# Patient Record
Sex: MALE | Race: WHITE | Employment: UNEMPLOYED | URBAN - METROPOLITAN AREA
[De-identification: names, ages, dates, MRNs, and addresses within clinical notes are randomized per-mention and may not be internally consistent; named-entity substitution may affect disease eponyms.]

---

## 2019-08-28 ENCOUNTER — APPOINTMENT (OUTPATIENT)
Dept: GENERAL RADIOLOGY | Age: 74
DRG: 041 | End: 2019-08-28
Payer: MEDICARE

## 2019-08-28 ENCOUNTER — APPOINTMENT (OUTPATIENT)
Dept: CT IMAGING | Age: 74
DRG: 041 | End: 2019-08-28
Payer: MEDICARE

## 2019-08-28 ENCOUNTER — HOSPITAL ENCOUNTER (INPATIENT)
Age: 74
LOS: 2 days | Discharge: NURSING, REHAB OR LONG-TERM CARE | DRG: 041 | End: 2019-08-30
Attending: EMERGENCY MEDICINE | Admitting: INTERNAL MEDICINE
Payer: MEDICARE

## 2019-08-28 DIAGNOSIS — R47.1 DYSARTHRIA: ICD-10-CM

## 2019-08-28 DIAGNOSIS — R29.810 FACIAL DROOP DUE TO ACUTE STROKE (HCC): Primary | ICD-10-CM

## 2019-08-28 DIAGNOSIS — I63.9 FACIAL DROOP DUE TO ACUTE STROKE (HCC): Primary | ICD-10-CM

## 2019-08-28 DIAGNOSIS — R47.01 APHASIA: ICD-10-CM

## 2019-08-28 LAB
A/G RATIO: 1.4 (ref 1.1–2.2)
ABO/RH: NORMAL
ALBUMIN SERPL-MCNC: 4.3 G/DL (ref 3.4–5)
ALP BLD-CCNC: 61 U/L (ref 40–129)
ALT SERPL-CCNC: 22 U/L (ref 10–40)
ANION GAP SERPL CALCULATED.3IONS-SCNC: 11 MMOL/L (ref 3–16)
ANTIBODY SCREEN: NORMAL
AST SERPL-CCNC: 24 U/L (ref 15–37)
BASOPHILS ABSOLUTE: 0.1 K/UL (ref 0–0.2)
BASOPHILS RELATIVE PERCENT: 1.1 %
BILIRUB SERPL-MCNC: 0.5 MG/DL (ref 0–1)
BUN BLDV-MCNC: 11 MG/DL (ref 7–20)
CALCIUM SERPL-MCNC: 9 MG/DL (ref 8.3–10.6)
CHLORIDE BLD-SCNC: 95 MMOL/L (ref 99–110)
CO2: 24 MMOL/L (ref 21–32)
CREAT SERPL-MCNC: 0.7 MG/DL (ref 0.8–1.3)
EOSINOPHILS ABSOLUTE: 0 K/UL (ref 0–0.6)
EOSINOPHILS RELATIVE PERCENT: 0.4 %
ETHANOL: NORMAL MG/DL (ref 0–0.08)
GFR AFRICAN AMERICAN: >60
GFR NON-AFRICAN AMERICAN: >60
GLOBULIN: 3 G/DL
GLUCOSE BLD-MCNC: 101 MG/DL
GLUCOSE BLD-MCNC: 110 MG/DL (ref 70–99)
HCT VFR BLD CALC: 40.3 % (ref 40.5–52.5)
HEMOGLOBIN: 14.2 G/DL (ref 13.5–17.5)
INR BLD: 1.04 (ref 0.86–1.14)
LYMPHOCYTES ABSOLUTE: 0.9 K/UL (ref 1–5.1)
LYMPHOCYTES RELATIVE PERCENT: 14.3 %
MCH RBC QN AUTO: 33 PG (ref 26–34)
MCHC RBC AUTO-ENTMCNC: 35.4 G/DL (ref 31–36)
MCV RBC AUTO: 93.2 FL (ref 80–100)
MONOCYTES ABSOLUTE: 0.5 K/UL (ref 0–1.3)
MONOCYTES RELATIVE PERCENT: 9 %
NEUTROPHILS ABSOLUTE: 4.5 K/UL (ref 1.7–7.7)
NEUTROPHILS RELATIVE PERCENT: 75.2 %
PDW BLD-RTO: 14.3 % (ref 12.4–15.4)
PLATELET # BLD: 154 K/UL (ref 135–450)
PMV BLD AUTO: 6.9 FL (ref 5–10.5)
POTASSIUM REFLEX MAGNESIUM: 4.1 MMOL/L (ref 3.5–5.1)
PROTHROMBIN TIME: 11.8 SEC (ref 9.8–13)
RBC # BLD: 4.32 M/UL (ref 4.2–5.9)
SODIUM BLD-SCNC: 130 MMOL/L (ref 136–145)
TOTAL PROTEIN: 7.3 G/DL (ref 6.4–8.2)
TROPONIN: <0.01 NG/ML
WBC # BLD: 6 K/UL (ref 4–11)

## 2019-08-28 PROCEDURE — 86901 BLOOD TYPING SEROLOGIC RH(D): CPT

## 2019-08-28 PROCEDURE — 6370000000 HC RX 637 (ALT 250 FOR IP): Performed by: INTERNAL MEDICINE

## 2019-08-28 PROCEDURE — 84484 ASSAY OF TROPONIN QUANT: CPT

## 2019-08-28 PROCEDURE — 70496 CT ANGIOGRAPHY HEAD: CPT

## 2019-08-28 PROCEDURE — 93005 ELECTROCARDIOGRAM TRACING: CPT | Performed by: EMERGENCY MEDICINE

## 2019-08-28 PROCEDURE — 70450 CT HEAD/BRAIN W/O DYE: CPT

## 2019-08-28 PROCEDURE — G0480 DRUG TEST DEF 1-7 CLASSES: HCPCS

## 2019-08-28 PROCEDURE — 80053 COMPREHEN METABOLIC PANEL: CPT

## 2019-08-28 PROCEDURE — 71045 X-RAY EXAM CHEST 1 VIEW: CPT

## 2019-08-28 PROCEDURE — 2060000000 HC ICU INTERMEDIATE R&B

## 2019-08-28 PROCEDURE — 99285 EMERGENCY DEPT VISIT HI MDM: CPT

## 2019-08-28 PROCEDURE — 85610 PROTHROMBIN TIME: CPT

## 2019-08-28 PROCEDURE — 6360000004 HC RX CONTRAST MEDICATION: Performed by: EMERGENCY MEDICINE

## 2019-08-28 PROCEDURE — 6370000000 HC RX 637 (ALT 250 FOR IP): Performed by: EMERGENCY MEDICINE

## 2019-08-28 PROCEDURE — 86850 RBC ANTIBODY SCREEN: CPT

## 2019-08-28 PROCEDURE — 86900 BLOOD TYPING SEROLOGIC ABO: CPT

## 2019-08-28 PROCEDURE — 85025 COMPLETE CBC W/AUTO DIFF WBC: CPT

## 2019-08-28 PROCEDURE — 2580000003 HC RX 258: Performed by: INTERNAL MEDICINE

## 2019-08-28 RX ORDER — ASPIRIN 300 MG/1
300 SUPPOSITORY RECTAL DAILY
Status: DISCONTINUED | OUTPATIENT
Start: 2019-08-29 | End: 2019-08-30 | Stop reason: HOSPADM

## 2019-08-28 RX ORDER — ONDANSETRON 2 MG/ML
4 INJECTION INTRAMUSCULAR; INTRAVENOUS EVERY 6 HOURS PRN
Status: DISCONTINUED | OUTPATIENT
Start: 2019-08-28 | End: 2019-08-30 | Stop reason: HOSPADM

## 2019-08-28 RX ORDER — METOPROLOL SUCCINATE 50 MG/1
50 TABLET, EXTENDED RELEASE ORAL EVERY EVENING
Status: DISCONTINUED | OUTPATIENT
Start: 2019-08-28 | End: 2019-08-30 | Stop reason: HOSPADM

## 2019-08-28 RX ORDER — LABETALOL HYDROCHLORIDE 5 MG/ML
10 INJECTION, SOLUTION INTRAVENOUS EVERY 10 MIN PRN
Status: DISCONTINUED | OUTPATIENT
Start: 2019-08-28 | End: 2019-08-30 | Stop reason: HOSPADM

## 2019-08-28 RX ORDER — SODIUM CHLORIDE 0.9 % (FLUSH) 0.9 %
10 SYRINGE (ML) INJECTION PRN
Status: DISCONTINUED | OUTPATIENT
Start: 2019-08-28 | End: 2019-08-30 | Stop reason: HOSPADM

## 2019-08-28 RX ORDER — SODIUM CHLORIDE 0.9 % (FLUSH) 0.9 %
10 SYRINGE (ML) INJECTION EVERY 12 HOURS SCHEDULED
Status: DISCONTINUED | OUTPATIENT
Start: 2019-08-28 | End: 2019-08-30 | Stop reason: HOSPADM

## 2019-08-28 RX ORDER — ASPIRIN 81 MG/1
81 TABLET ORAL DAILY
Status: DISCONTINUED | OUTPATIENT
Start: 2019-08-29 | End: 2019-08-30 | Stop reason: HOSPADM

## 2019-08-28 RX ORDER — ROSUVASTATIN CALCIUM 10 MG/1
40 TABLET, COATED ORAL NIGHTLY
Status: DISCONTINUED | OUTPATIENT
Start: 2019-08-28 | End: 2019-08-30 | Stop reason: HOSPADM

## 2019-08-28 RX ORDER — ASPIRIN 81 MG/1
324 TABLET, CHEWABLE ORAL ONCE
Status: COMPLETED | OUTPATIENT
Start: 2019-08-28 | End: 2019-08-28

## 2019-08-28 RX ORDER — METOPROLOL SUCCINATE 50 MG/1
50 TABLET, EXTENDED RELEASE ORAL EVERY EVENING
Status: ON HOLD | COMMUNITY
Start: 2018-10-02 | End: 2019-09-09 | Stop reason: HOSPADM

## 2019-08-28 RX ADMIN — ASPIRIN 81 MG 324 MG: 81 TABLET ORAL at 16:41

## 2019-08-28 RX ADMIN — IOPAMIDOL 75 ML: 755 INJECTION, SOLUTION INTRAVENOUS at 14:14

## 2019-08-28 RX ADMIN — SODIUM CHLORIDE, PRESERVATIVE FREE 10 ML: 5 INJECTION INTRAVENOUS at 21:02

## 2019-08-28 RX ADMIN — METOPROLOL SUCCINATE 50 MG: 50 TABLET, EXTENDED RELEASE ORAL at 21:02

## 2019-08-28 RX ADMIN — ROSUVASTATIN CALCIUM 40 MG: 10 TABLET, FILM COATED ORAL at 21:02

## 2019-08-28 ASSESSMENT — PAIN SCALES - GENERAL: PAINLEVEL_OUTOF10: 0

## 2019-08-28 NOTE — ED NOTES
Bed: B-10  Expected date:   Expected time:   Means of arrival:   Comments:  LILO Driver RN  08/28/19 8854

## 2019-08-28 NOTE — ED NOTES
ED SBAR report provider to Maribeth Chamorro RN. Patient to be transported to Room 3110 via stretcher by transport tech. Patient transported with bedside cardiac monitor and with IV medications infusing. IV site clean, dry, and intact. MEWS score and pain assessed and documented. Updated patient and family on plan of care.      Rea Frye RN  08/28/19 0383

## 2019-08-28 NOTE — ED PROVIDER NOTES
loss  Facial Palsy:  2 - partial paralysis (total or near total paralysis of the lower face)  Motor-Arm-Left:  0 - no drift, limb holds 90 (or 45) degrees for full 10 seconds  Motor-Leg-Left:  0 - no drift; leg holds 30 degree position for full 5 seconds  Motor-Arm-Right:  1 - drift, limb holds 90 (or 45) degrees but drifts down before full 10 seconds: does not hit bed  Motor-Leg-Right:  0 - no drift; leg holds 30 degree position for full 5 seconds  Limb Ataxia:  0 - absent  Sensory:  0 - normal; no sensory loss  Best Language:  1 - mild to moderate aphasia; some obvious loss of fluency or facility of comprehension without significant limitation on ideas expressed or form of expression. Reduction of speech and/or comprehension, however, makes conversation about provided materials difficult or impossible. For example, in conversation about provided materials, examiner can identify picture or naming card content from patient's response. Dysarthria:  1 - mild to moderate, patient slurs at least some words and at worst, can be understood with some difficulty  Extinction and Inattention:  0 - no abnormality  Total: 6        LABS and DIAGNOSTIC RESULTS  EKG  The Ekg interpreted by me shows  normal sinus rhythm with a rate of 64  Axis is   Normal  QTc is  normal  First-degree AV block  ST Segments: normal  No prior EKG available for comparison. RADIOLOGY  X-RAYS:  I have reviewed radiologic plain film image(s). ALL OTHER NON-PLAIN FILM IMAGES SUCH AS CT, ULTRASOUND AND MRI HAVE BEEN READ BY THE RADIOLOGIST. XR CHEST PORTABLE   Final Result   Cardiomegaly with bibasilar hypoaeration. Otherwise, no acute abnormalities   seen in the chest         CTA HEAD NECK W CONTRAST   Preliminary Result   1. Approximately 50% left ICA stenosis by NASCET criteria due to mixed   atherosclerotic plaque. 2. Approximately 20% right ICA stenosis by NASCET criteria due to eccentric   calcified atherosclerotic plaque.    3. No generated using the [address] [place] [address] dictation system. I created this record but it may contain dictation errors.           Malachi Solis MD  08/28/19 1845

## 2019-08-28 NOTE — H&P
10\" (1.778 m)   Wt 191 lb 2.2 oz (86.7 kg)   SpO2 98%   BMI 27.43 kg/m²     General appearance:  No apparent distress, appears stated age and cooperative. HEENT:  Normal cephalic, atraumatic without obvious deformity. Pupils equal, round, and reactive to light. Extra ocular muscles intact. Conjunctivae/corneas clear. Neck: Supple, with full range of motion. No jugular venous distention. Trachea midline. Respiratory:  Normal respiratory effort. Clear to auscultation, bilaterally without Rales/Wheezes/Rhonchi. Cardiovascular:  Regular rate and rhythm with normal S1/S2 without murmurs, rubs or gallops. Abdomen: Soft, non-tender, non-distended with normal bowel sounds. Musculoskeletal:  No clubbing, cyanosis or edema bilaterally. Full range of motion without deformity. Skin: Skin color, texture, turgor normal.  No rashes or lesions. Neurologic: Right arm weakness and discoordination. Right facial droop deep tendon  reflexes brisk on the right side  Psychiatric:  Alert and oriented, thought content appropriate, normal insight  Capillary Refill: Brisk,< 3 seconds   Peripheral Pulses: +2 palpable, equal bilaterally       Labs:     Recent Labs     08/28/19  1434   WBC 6.0   HGB 14.2   HCT 40.3*        Recent Labs     08/28/19  1434   *   K 4.1   CL 95*   CO2 24   BUN 11   CREATININE 0.7*   CALCIUM 9.0     Recent Labs     08/28/19  1434   AST 24   ALT 22   BILITOT 0.5   ALKPHOS 61     Recent Labs     08/28/19  1434   INR 1.04     Recent Labs     08/28/19  1434   TROPONINI <0.01       Urinalysis:    No results found for: Inell Broom, BACTERIA, RBCUA, BLOODU, [address], [address]    Radiology:      reviewed by me    XR CHEST PORTABLE   Final Result   Cardiomegaly with bibasilar hypoaeration. Otherwise, no acute abnormalities   seen in the chest         CTA HEAD NECK W CONTRAST   Final Result   1. Approximately 50% left ICA stenosis by NASCET criteria due to mixed   atherosclerotic plaque.    2.

## 2019-08-28 NOTE — ED NOTES
Pharmacy Medication Reconciliation Note     List of medications patient is currently taking is complete. Allergies:  Patient has no known allergies. Source of information:   1. Disp. Record  2. Patient/family    Notes regarding home medications:   1.  Reports he did not take his medication yet today    Denies taking any other OTC or herbal medications    Emily Daigle PharmD, BCPS  8/28/2019  5:35 PM

## 2019-08-29 ENCOUNTER — APPOINTMENT (OUTPATIENT)
Dept: MRI IMAGING | Age: 74
DRG: 041 | End: 2019-08-29
Payer: MEDICARE

## 2019-08-29 LAB
ANION GAP SERPL CALCULATED.3IONS-SCNC: 11 MMOL/L (ref 3–16)
BUN BLDV-MCNC: 9 MG/DL (ref 7–20)
CALCIUM SERPL-MCNC: 9.5 MG/DL (ref 8.3–10.6)
CHLORIDE BLD-SCNC: 97 MMOL/L (ref 99–110)
CHOLESTEROL, TOTAL: 186 MG/DL (ref 0–199)
CO2: 23 MMOL/L (ref 21–32)
CREAT SERPL-MCNC: 0.7 MG/DL (ref 0.8–1.3)
EKG ATRIAL RATE: 64 BPM
EKG DIAGNOSIS: NORMAL
EKG P AXIS: 83 DEGREES
EKG P-R INTERVAL: 214 MS
EKG Q-T INTERVAL: 398 MS
EKG QRS DURATION: 72 MS
EKG QTC CALCULATION (BAZETT): 410 MS
EKG R AXIS: -9 DEGREES
EKG T AXIS: 23 DEGREES
EKG VENTRICULAR RATE: 64 BPM
ESTIMATED AVERAGE GLUCOSE: 88.2 MG/DL
GFR AFRICAN AMERICAN: >60
GFR NON-AFRICAN AMERICAN: >60
GLUCOSE BLD-MCNC: 105 MG/DL (ref 70–99)
HBA1C MFR BLD: 4.7 %
HCT VFR BLD CALC: 40.7 % (ref 40.5–52.5)
HDLC SERPL-MCNC: 64 MG/DL (ref 40–60)
HEMOGLOBIN: 14.4 G/DL (ref 13.5–17.5)
LDL CHOLESTEROL CALCULATED: 100 MG/DL
LV EF: 55 %
LVEF MODALITY: NORMAL
MCH RBC QN AUTO: 32.6 PG (ref 26–34)
MCHC RBC AUTO-ENTMCNC: 35.3 G/DL (ref 31–36)
MCV RBC AUTO: 92.4 FL (ref 80–100)
PDW BLD-RTO: 14.3 % (ref 12.4–15.4)
PLATELET # BLD: 153 K/UL (ref 135–450)
PMV BLD AUTO: 7.2 FL (ref 5–10.5)
POTASSIUM SERPL-SCNC: 4.7 MMOL/L (ref 3.5–5.1)
RBC # BLD: 4.41 M/UL (ref 4.2–5.9)
SODIUM BLD-SCNC: 131 MMOL/L (ref 136–145)
TRIGL SERPL-MCNC: 110 MG/DL (ref 0–150)
VLDLC SERPL CALC-MCNC: 22 MG/DL
WBC # BLD: 4.9 K/UL (ref 4–11)

## 2019-08-29 PROCEDURE — 83036 HEMOGLOBIN GLYCOSYLATED A1C: CPT

## 2019-08-29 PROCEDURE — 70553 MRI BRAIN STEM W/O & W/DYE: CPT

## 2019-08-29 PROCEDURE — 93010 ELECTROCARDIOGRAM REPORT: CPT | Performed by: INTERNAL MEDICINE

## 2019-08-29 PROCEDURE — 2060000000 HC ICU INTERMEDIATE R&B

## 2019-08-29 PROCEDURE — 97127 HC SP THER IVNTJ W/FOCUS COG FUNCJ: CPT

## 2019-08-29 PROCEDURE — 97530 THERAPEUTIC ACTIVITIES: CPT

## 2019-08-29 PROCEDURE — 6360000004 HC RX CONTRAST MEDICATION: Performed by: INTERNAL MEDICINE

## 2019-08-29 PROCEDURE — A9577 INJ MULTIHANCE: HCPCS | Performed by: INTERNAL MEDICINE

## 2019-08-29 PROCEDURE — 80048 BASIC METABOLIC PNL TOTAL CA: CPT

## 2019-08-29 PROCEDURE — 6370000000 HC RX 637 (ALT 250 FOR IP): Performed by: INTERNAL MEDICINE

## 2019-08-29 PROCEDURE — 92610 EVALUATE SWALLOWING FUNCTION: CPT

## 2019-08-29 PROCEDURE — 94760 N-INVAS EAR/PLS OXIMETRY 1: CPT

## 2019-08-29 PROCEDURE — 2580000003 HC RX 258: Performed by: INTERNAL MEDICINE

## 2019-08-29 PROCEDURE — 80061 LIPID PANEL: CPT

## 2019-08-29 PROCEDURE — 85027 COMPLETE CBC AUTOMATED: CPT

## 2019-08-29 PROCEDURE — 97535 SELF CARE MNGMENT TRAINING: CPT

## 2019-08-29 PROCEDURE — 97166 OT EVAL MOD COMPLEX 45 MIN: CPT

## 2019-08-29 PROCEDURE — 93306 TTE W/DOPPLER COMPLETE: CPT

## 2019-08-29 PROCEDURE — 36415 COLL VENOUS BLD VENIPUNCTURE: CPT

## 2019-08-29 PROCEDURE — 97162 PT EVAL MOD COMPLEX 30 MIN: CPT

## 2019-08-29 PROCEDURE — 6360000002 HC RX W HCPCS: Performed by: INTERNAL MEDICINE

## 2019-08-29 PROCEDURE — 92523 SPEECH SOUND LANG COMPREHEN: CPT

## 2019-08-29 RX ADMIN — METOPROLOL SUCCINATE 50 MG: 50 TABLET, EXTENDED RELEASE ORAL at 20:25

## 2019-08-29 RX ADMIN — GADOBENATE DIMEGLUMINE 17 ML: 529 INJECTION, SOLUTION INTRAVENOUS at 11:12

## 2019-08-29 RX ADMIN — SODIUM CHLORIDE, PRESERVATIVE FREE 10 ML: 5 INJECTION INTRAVENOUS at 20:25

## 2019-08-29 RX ADMIN — ENOXAPARIN SODIUM 40 MG: 40 INJECTION SUBCUTANEOUS at 08:51

## 2019-08-29 RX ADMIN — SODIUM CHLORIDE, PRESERVATIVE FREE 10 ML: 5 INJECTION INTRAVENOUS at 08:57

## 2019-08-29 RX ADMIN — ROSUVASTATIN CALCIUM 40 MG: 10 TABLET, FILM COATED ORAL at 20:25

## 2019-08-29 RX ADMIN — ASPIRIN 81 MG: 81 TABLET ORAL at 08:51

## 2019-08-29 ASSESSMENT — PAIN SCALES - GENERAL
PAINLEVEL_OUTOF10: 0
PAINLEVEL_OUTOF10: 0

## 2019-08-29 NOTE — PROGRESS NOTES
Physical Therapy    Facility/Department: [address] PROGRESSIVE CARE  Initial Assessment/Treatment Session  This note serves as D/C summary if patient is discharged prior to next treatment session. NAME: Hector Salinas  : 1945  MRN: [id number]    Date of Service: 2019    Discharge Recommendations:  Patient would benefit from continued therapy after discharge, 5-7 sessions per week   PT Equipment Recommendations  Other: Will continue to assess    Assessment   Body structures, Functions, Activity limitations: Decreased functional mobility ; Decreased strength;Decreased safe awareness;Decreased balance  Assessment: Pt is a 69 y.o. M. admitted  for CVA, R-sided weakness. He presents with moderate dysarthria, and PT noted significant RLE weakness upon evaluation. Pt was very unsteady ambulating without walker support, requiring up to Mod A to maintain his balance. He would benefit from continued therapy to improve his safety awareness, balance, strength, and independence performing all mobility tasks. Recommend high-frequency therapy upon D/C to address these deficits. Hector Salinas scored a 16/24 on the AM-PAC short mobility form. Current research shows that an AM-PAC score of 17 or less is typically not associated with a discharge to the patient's home setting. Based on the patients AM-PAC score and their current functional mobility deficits, it is recommended that the patient have 5-7 sessions per week of Physical Therapy at d/c to increase the patients independence. Treatment Diagnosis: Decreased functional mobility; Difficulty walking; Decreased balance  Specific instructions for Next Treatment: Progress ambulation with LRAD; improve balance and RLE strength  Prognosis: Good  Decision Making: Medium Complexity  History: See below  Exam: Strength; ROM; Balance;  Ambulation  Clinical Presentation: Evolving  Barriers to Learning: Decreased safety awareness  REQUIRES PT FOLLOW UP: Electronically signed by Tonny Marrero, PT [id number] on 8/29/2019 at 1:26 PM

## 2019-08-29 NOTE — PROGRESS NOTES
assessment of auditory processing, reading and writing. Patient/Family Education:Education, results and recommendations given to the Pt and nurse, who verbalized understanding    Timed Code Treatment:  15 minutes    Total Treatment Time: 45 minutes    Please accept this document as a D/C summary if pt is discharged from the hospital prior to the next speech therapy session.        Nick Dunbar M.A./[facility]-SLP #[id number]

## 2019-08-29 NOTE — PROGRESS NOTES
Pt was transferred from ED to [facility] before this shift started. Pt has monitor on and MW is aware pt is here. Oriented pt to room, call light, hosp policy. Pt expressed understanding denied questions, denied pain.

## 2019-08-29 NOTE — PROGRESS NOTES
Hospital Medicine Progress Note      Admit Date: 8/28/2019         Overnight Events: No    CC: F/U for Acute stroke    HPI: The patient is a 69year old male, with a past medical history significant for HTN, who presented to [facility] [place] with 1 day history of right arm weakness, facial droop and dysarthria. Symptoms began the morning of admission. The patient was found at the bottom of the stairs and was unable to ambulate. The previous evening, the patient had gone to bed around 8 PM - this was his last known normal.   Imaging studies were confirmatory for an acute infarct. The patient was admitted for further workup and treatment. Neurology was consulted. Echo cardiogram was ordered. The patient was seen by PT/OT/SLP. Interval History/Subjective: No new complaints. The patient has been seen by neurology. He does have some trouble with word finding. Review of Systems:     Comprehensive ROS negative except as mentioned above. Past Medical History:        Diagnosis Date    Hypertension        Past Surgical History:        Procedure Laterality Date    PROSTATE SURGERY  2004       Allergies:  Patient has no known allergies. Past medical and surgical history reviewed. Any changes have been noted.      Scheduled and prn Medications:    Scheduled Meds:   metoprolol succinate  50 mg Oral QPM    sodium chloride flush  10 mL Intravenous 2 times per day    enoxaparin  40 mg Subcutaneous Daily    aspirin  81 mg Oral Daily    Or    aspirin  300 mg Rectal Daily    rosuvastatin  40 mg Oral Nightly     Continuous Infusions:  PRN Meds:.sodium chloride flush, magnesium hydroxide, ondansetron, labetalol, perflutren lipid microspheres    PHYSICAL EXAM:  BP (!) 174/89   Pulse 63   Temp 97.8 °F (36.6 °C) (Oral)   Resp 16   Ht 5' 10\" (1.778 m)   Wt 185 lb 13.6 oz (84.3 kg)   SpO2 97%   BMI 26.67 kg/m²       Intake/Output Summary (Last 24 hours) at 8/29/2019 8032  Last data filed at 8/28/2019

## 2019-08-29 NOTE — PROGRESS NOTES
Physical Therapy  Progress note  Pt requesting assist to restroom. Pt stood from chair with Min A, cues for hand placement, then ambulated 20' to commode with walker support, requiring Min A to correct for intermittent LOB to his R.  Pt sat at commode with Min A, then had a large BM. He required 50% assist for theodore-care. He stood to walker with CGA, then was able to maintain standing balance at sink with Min A while washing his hands. He required Min A to ambulate 20' back to chair with walker. He was positioned for comfort with lunch tray, call light in reach, alarm in place. Time In: 1348  Time Coded Tx: 12 min.    Electronically signed by Dario Hernandez, PT [id number] on 8/29/2019 at 2:56 PM

## 2019-08-29 NOTE — PROGRESS NOTES
ROM/MMT  Assistance / Modification: ANticipate min A for ADLs  OT Education: OT Role;Plan of Care;Transfer Training;Family Education  Barriers to Learning: cognition  REQUIRES OT FOLLOW UP: Yes  Activity Tolerance  Activity Tolerance: Patient limited by fatigue;Treatment limited secondary to decreased cognition  Safety Devices  Safety Devices in place: Yes  Type of devices: Left in chair;Call light within reach;Gait belt;Nurse notified; Chair alarm in place(RN Adela Land)           Patient Diagnosis(es): The primary encounter diagnosis was Facial droop due to acute stroke [facility]). Diagnoses of Aphasia and Dysarthria were also pertinent to this visit. has a past medical history of Hypertension. has a past surgical history that includes Prostate surgery (2004). Restrictions  Restrictions/Precautions  Restrictions/Precautions: Fall Risk  Position Activity Restriction  Other position/activity restrictions: NPO    Subjective   General  Chart Reviewed: Yes  Patient assessed for rehabilitation services?: Yes  Additional Pertinent Hx: Per H&P: \"73 y.o. male who presented to [facility] with 1 day history of right arm weakness and facial droop and dysarthria. Symptoms appear to have started this morning. Patient was found at the bottom of the stairs and unable to ambulate. Patient went to bed around 8 Pm yesterday evening this was his last known well. Patient had expressive aphasia and some dysarthria and was brought to the hospital past the window for TPA. \"  Family / Caregiver Present: No  Referring Practitioner: Uriel Pulliam MD  Diagnosis: Acute ischemic stroke likely left basal ganglia  Subjective  Subjective: Pt met bedside for OT eval/tx. Pt supine in bed with family present.  Pt agreeable to therapy, denies pain  Patient Currently in Pain: Denies  Pain Assessment  Pain Assessment: 0-10  Pain Level: 0  Vital Signs  Temp: 97.8 °F (36.6 °C)  Temp Source: Oral  Pulse: 63  Heart Rate Source: Monitor  Resp: 16  BP: (!) 174/89  BP Location: Left upper arm  MAP (mmHg): 114  Level of Consciousness: Alert  MEWS Score: 1  Patient Currently in Pain: Denies  Oxygen Therapy  SpO2: 97 %  Pulse Oximeter Device Mode: Intermittent  Pulse Oximeter Device Location: Finger  O2 Device: None (Room air)  Social/Functional History  Social/Functional History  Lives With: Spouse  Type of Home: (condo)  Home Layout: One level, Laundry in basement(pt's bedroom in basement)  Home Access: Stairs to enter with rails  Entrance Stairs - Number of Steps: 1 porch step  Bathroom Shower/Tub: Walk-in shower  Bathroom Toilet: Standard(has raised BSC to put over toilet but does not use)  Bathroom Equipment: Built-in shower seat, Grab bars in shower(sink near toilet)  Home Equipment: Cane, Rolling walker, Lift chair  ADL Assistance: Independent  Homemaking Assistance: Independent(shares with spouse)  Ambulation Assistance: Independent  Transfer Assistance: Independent  Active : Yes  Occupation: Retired  Type of occupation:  for [employer]  Additional Comments: Denies h/o falls. Plays golf 2x a week       Objective   Vision: Within Functional Limits  Hearing: Within functional limits    Orientation  Overall Orientation Status: Within Normal Limits     Balance  Sitting Balance: Stand by assistance(EOB)  Standing Balance: Minimal assistance(pt with posterior lean)  Functional Mobility  Functional - Mobility Device: No device  Activity: Other(bed > chair)  Assist Level: Minimal assistance  Functional Mobility Comments: Pt performed functional mobility short distance from bed > chair with min A.  Pt does demo unsteadiness, posterior lean  ADL  Feeding: NPO  Grooming: Setup(to wash face and brush teeth seated in recliner)  LE Dressing: Stand by assistance(pt adjusted socks while upright in bed)  Additional Comments: Anticipate min A for UB bathing, SBA for UB dressing, min A for LB bathing, min A for toileting based on ROM,

## 2019-08-30 ENCOUNTER — HOSPITAL ENCOUNTER (INPATIENT)
Age: 74
LOS: 11 days | Discharge: HOME HEALTH | DRG: 057 | End: 2019-09-10
Attending: PHYSICAL MEDICINE & REHABILITATION | Admitting: PHYSICAL MEDICINE & REHABILITATION
Payer: MEDICARE

## 2019-08-30 ENCOUNTER — HOSPITAL ENCOUNTER (INPATIENT)
Dept: CARDIAC CATH/INVASIVE PROCEDURES | Age: 74
Discharge: HOME | DRG: 041 | End: 2019-08-30
Payer: MEDICARE

## 2019-08-30 VITALS
SYSTOLIC BLOOD PRESSURE: 149 MMHG | HEIGHT: 70 IN | OXYGEN SATURATION: 99 % | WEIGHT: 180.12 LBS | RESPIRATION RATE: 17 BRPM | HEART RATE: 73 BPM | DIASTOLIC BLOOD PRESSURE: 54 MMHG | BODY MASS INDEX: 25.79 KG/M2 | TEMPERATURE: 98.2 F

## 2019-08-30 DIAGNOSIS — Z45.09 ENCOUNTER FOR ELECTRONIC ANALYSIS OF REVEAL EVENT RECORDER: Primary | ICD-10-CM

## 2019-08-30 DIAGNOSIS — I63.9 ISCHEMIC STROKE (HCC): ICD-10-CM

## 2019-08-30 LAB
ANION GAP SERPL CALCULATED.3IONS-SCNC: 11 MMOL/L (ref 3–16)
BASOPHILS ABSOLUTE: 0.1 K/UL (ref 0–0.2)
BASOPHILS RELATIVE PERCENT: 1.7 %
BUN BLDV-MCNC: 10 MG/DL (ref 7–20)
CALCIUM SERPL-MCNC: 9.2 MG/DL (ref 8.3–10.6)
CHLORIDE BLD-SCNC: 98 MMOL/L (ref 99–110)
CO2: 22 MMOL/L (ref 21–32)
CREAT SERPL-MCNC: 0.6 MG/DL (ref 0.8–1.3)
EOSINOPHILS ABSOLUTE: 0.1 K/UL (ref 0–0.6)
EOSINOPHILS RELATIVE PERCENT: 1.2 %
GFR AFRICAN AMERICAN: >60
GFR NON-AFRICAN AMERICAN: >60
GLUCOSE BLD-MCNC: 103 MG/DL (ref 70–99)
HCT VFR BLD CALC: 40.7 % (ref 40.5–52.5)
HEMOGLOBIN: 14.5 G/DL (ref 13.5–17.5)
LYMPHOCYTES ABSOLUTE: 1.2 K/UL (ref 1–5.1)
LYMPHOCYTES RELATIVE PERCENT: 23 %
MCH RBC QN AUTO: 33.1 PG (ref 26–34)
MCHC RBC AUTO-ENTMCNC: 35.6 G/DL (ref 31–36)
MCV RBC AUTO: 92.9 FL (ref 80–100)
MONOCYTES ABSOLUTE: 0.6 K/UL (ref 0–1.3)
MONOCYTES RELATIVE PERCENT: 10.6 %
NEUTROPHILS ABSOLUTE: 3.4 K/UL (ref 1.7–7.7)
NEUTROPHILS RELATIVE PERCENT: 63.5 %
PDW BLD-RTO: 14.5 % (ref 12.4–15.4)
PLATELET # BLD: 156 K/UL (ref 135–450)
PMV BLD AUTO: 7.4 FL (ref 5–10.5)
POTASSIUM REFLEX MAGNESIUM: 3.7 MMOL/L (ref 3.5–5.1)
RBC # BLD: 4.38 M/UL (ref 4.2–5.9)
SODIUM BLD-SCNC: 131 MMOL/L (ref 136–145)
WBC # BLD: 5.3 K/UL (ref 4–11)

## 2019-08-30 PROCEDURE — 6370000000 HC RX 637 (ALT 250 FOR IP): Performed by: PHYSICAL MEDICINE & REHABILITATION

## 2019-08-30 PROCEDURE — 94760 N-INVAS EAR/PLS OXIMETRY 1: CPT

## 2019-08-30 PROCEDURE — 0JH632Z INSERTION OF MONITORING DEVICE INTO CHEST SUBCUTANEOUS TISSUE AND FASCIA, PERCUTANEOUS APPROACH: ICD-10-PCS | Performed by: INTERNAL MEDICINE

## 2019-08-30 PROCEDURE — 93285 PRGRMG DEV EVAL SCRMS IP: CPT | Performed by: INTERNAL MEDICINE

## 2019-08-30 PROCEDURE — 97116 GAIT TRAINING THERAPY: CPT

## 2019-08-30 PROCEDURE — 6370000000 HC RX 637 (ALT 250 FOR IP): Performed by: NURSE PRACTITIONER

## 2019-08-30 PROCEDURE — 6370000000 HC RX 637 (ALT 250 FOR IP): Performed by: INTERNAL MEDICINE

## 2019-08-30 PROCEDURE — 85025 COMPLETE CBC W/AUTO DIFF WBC: CPT

## 2019-08-30 PROCEDURE — 33285 INSJ SUBQ CAR RHYTHM MNTR: CPT | Performed by: FAMILY MEDICINE

## 2019-08-30 PROCEDURE — 97113 AQUATIC THERAPY/EXERCISES: CPT

## 2019-08-30 PROCEDURE — 1280000000 HC REHAB R&B

## 2019-08-30 PROCEDURE — C1764 EVENT RECORDER, CARDIAC: HCPCS

## 2019-08-30 PROCEDURE — 97530 THERAPEUTIC ACTIVITIES: CPT

## 2019-08-30 PROCEDURE — 80048 BASIC METABOLIC PNL TOTAL CA: CPT

## 2019-08-30 PROCEDURE — 97112 NEUROMUSCULAR REEDUCATION: CPT

## 2019-08-30 PROCEDURE — 6360000002 HC RX W HCPCS: Performed by: INTERNAL MEDICINE

## 2019-08-30 PROCEDURE — 36415 COLL VENOUS BLD VENIPUNCTURE: CPT

## 2019-08-30 PROCEDURE — 2580000003 HC RX 258: Performed by: INTERNAL MEDICINE

## 2019-08-30 RX ORDER — METOPROLOL SUCCINATE 50 MG/1
50 TABLET, EXTENDED RELEASE ORAL EVERY EVENING
Status: DISCONTINUED | OUTPATIENT
Start: 2019-08-30 | End: 2019-09-10 | Stop reason: HOSPADM

## 2019-08-30 RX ORDER — SENNA AND DOCUSATE SODIUM 50; 8.6 MG/1; MG/1
2 TABLET, FILM COATED ORAL 2 TIMES DAILY
Status: DISCONTINUED | OUTPATIENT
Start: 2019-08-30 | End: 2019-09-06

## 2019-08-30 RX ORDER — ASPIRIN 300 MG/1
300 SUPPOSITORY RECTAL DAILY
Status: DISCONTINUED | OUTPATIENT
Start: 2019-08-31 | End: 2019-09-10 | Stop reason: HOSPADM

## 2019-08-30 RX ORDER — ONDANSETRON 4 MG/1
4 TABLET, FILM COATED ORAL EVERY 8 HOURS PRN
Status: CANCELLED | OUTPATIENT
Start: 2019-08-30

## 2019-08-30 RX ORDER — POLYETHYLENE GLYCOL 3350 17 G/17G
17 POWDER, FOR SOLUTION ORAL DAILY
Status: CANCELLED | OUTPATIENT
Start: 2019-08-30

## 2019-08-30 RX ORDER — ASPIRIN 81 MG/1
81 TABLET ORAL DAILY
Qty: 30 TABLET | Refills: 0 | Status: SHIPPED | OUTPATIENT
Start: 2019-08-31 | End: 2019-09-30

## 2019-08-30 RX ORDER — LISINOPRIL 5 MG/1
5 TABLET ORAL DAILY
Status: DISCONTINUED | OUTPATIENT
Start: 2019-08-30 | End: 2019-08-30 | Stop reason: HOSPADM

## 2019-08-30 RX ORDER — BISACODYL 10 MG
10 SUPPOSITORY, RECTAL RECTAL DAILY PRN
Status: DISCONTINUED | OUTPATIENT
Start: 2019-08-30 | End: 2019-09-10 | Stop reason: HOSPADM

## 2019-08-30 RX ORDER — BISACODYL 10 MG
10 SUPPOSITORY, RECTAL RECTAL DAILY PRN
Status: CANCELLED | OUTPATIENT
Start: 2019-08-30

## 2019-08-30 RX ORDER — ROSUVASTATIN CALCIUM 10 MG/1
40 TABLET, COATED ORAL NIGHTLY
Status: CANCELLED | OUTPATIENT
Start: 2019-08-30

## 2019-08-30 RX ORDER — ACETAMINOPHEN 325 MG/1
650 TABLET ORAL EVERY 4 HOURS PRN
Status: DISCONTINUED | OUTPATIENT
Start: 2019-08-30 | End: 2019-09-10 | Stop reason: HOSPADM

## 2019-08-30 RX ORDER — ASPIRIN 81 MG/1
81 TABLET ORAL DAILY
Status: DISCONTINUED | OUTPATIENT
Start: 2019-08-31 | End: 2019-09-10 | Stop reason: HOSPADM

## 2019-08-30 RX ORDER — ROSUVASTATIN CALCIUM 40 MG/1
40 TABLET, COATED ORAL NIGHTLY
Qty: 30 TABLET | Refills: 0 | Status: ON HOLD | OUTPATIENT
Start: 2019-08-30 | End: 2019-09-10 | Stop reason: SDUPTHER

## 2019-08-30 RX ORDER — ACETAMINOPHEN 325 MG/1
650 TABLET ORAL EVERY 4 HOURS PRN
Status: CANCELLED | OUTPATIENT
Start: 2019-08-30

## 2019-08-30 RX ORDER — LISINOPRIL 5 MG/1
5 TABLET ORAL DAILY
Status: DISCONTINUED | OUTPATIENT
Start: 2019-08-31 | End: 2019-09-10 | Stop reason: HOSPADM

## 2019-08-30 RX ORDER — SENNA AND DOCUSATE SODIUM 50; 8.6 MG/1; MG/1
2 TABLET, FILM COATED ORAL 2 TIMES DAILY
Status: CANCELLED | OUTPATIENT
Start: 2019-08-30

## 2019-08-30 RX ORDER — METOPROLOL SUCCINATE 50 MG/1
50 TABLET, EXTENDED RELEASE ORAL EVERY EVENING
Status: CANCELLED | OUTPATIENT
Start: 2019-08-30

## 2019-08-30 RX ORDER — LISINOPRIL 5 MG/1
5 TABLET ORAL DAILY
Status: CANCELLED | OUTPATIENT
Start: 2019-08-31

## 2019-08-30 RX ORDER — CHLORHEXIDINE GLUCONATE 4 G/100ML
SOLUTION TOPICAL ONCE
Status: DISCONTINUED | OUTPATIENT
Start: 2019-08-30 | End: 2019-08-30 | Stop reason: HOSPADM

## 2019-08-30 RX ORDER — HYDRALAZINE HYDROCHLORIDE 25 MG/1
25 TABLET, FILM COATED ORAL EVERY 6 HOURS PRN
Status: CANCELLED | OUTPATIENT
Start: 2019-08-30

## 2019-08-30 RX ORDER — LISINOPRIL 5 MG/1
5 TABLET ORAL DAILY
Qty: 30 TABLET | Refills: 0 | Status: ON HOLD | OUTPATIENT
Start: 2019-08-31 | End: 2019-09-10 | Stop reason: SDUPTHER

## 2019-08-30 RX ORDER — HYDRALAZINE HYDROCHLORIDE 25 MG/1
25 TABLET, FILM COATED ORAL EVERY 6 HOURS PRN
Status: DISCONTINUED | OUTPATIENT
Start: 2019-08-30 | End: 2019-09-10 | Stop reason: HOSPADM

## 2019-08-30 RX ORDER — ROSUVASTATIN CALCIUM 10 MG/1
40 TABLET, COATED ORAL NIGHTLY
Status: DISCONTINUED | OUTPATIENT
Start: 2019-08-30 | End: 2019-09-10 | Stop reason: HOSPADM

## 2019-08-30 RX ORDER — ONDANSETRON 4 MG/1
4 TABLET, FILM COATED ORAL EVERY 8 HOURS PRN
Status: DISCONTINUED | OUTPATIENT
Start: 2019-08-30 | End: 2019-09-10 | Stop reason: HOSPADM

## 2019-08-30 RX ORDER — ASPIRIN 81 MG/1
81 TABLET ORAL DAILY
Status: CANCELLED | OUTPATIENT
Start: 2019-08-31

## 2019-08-30 RX ORDER — ASPIRIN 300 MG/1
300 SUPPOSITORY RECTAL DAILY
Status: CANCELLED | OUTPATIENT
Start: 2019-08-31

## 2019-08-30 RX ORDER — POLYETHYLENE GLYCOL 3350 17 G/17G
17 POWDER, FOR SOLUTION ORAL DAILY
Status: DISCONTINUED | OUTPATIENT
Start: 2019-08-31 | End: 2019-09-06

## 2019-08-30 RX ADMIN — METOPROLOL SUCCINATE 50 MG: 50 TABLET, EXTENDED RELEASE ORAL at 21:02

## 2019-08-30 RX ADMIN — LISINOPRIL 5 MG: 5 TABLET ORAL at 09:59

## 2019-08-30 RX ADMIN — ASPIRIN 81 MG: 81 TABLET ORAL at 09:59

## 2019-08-30 RX ADMIN — SODIUM CHLORIDE, PRESERVATIVE FREE 10 ML: 5 INJECTION INTRAVENOUS at 10:00

## 2019-08-30 RX ADMIN — SENNOSIDES, DOCUSATE SODIUM 2 TABLET: 50; 8.6 TABLET, FILM COATED ORAL at 21:02

## 2019-08-30 RX ADMIN — ENOXAPARIN SODIUM 40 MG: 40 INJECTION SUBCUTANEOUS at 09:59

## 2019-08-30 RX ADMIN — ROSUVASTATIN CALCIUM 40 MG: 10 TABLET, FILM COATED ORAL at 21:02

## 2019-08-30 ASSESSMENT — PAIN SCALES - GENERAL
PAINLEVEL_OUTOF10: 0

## 2019-08-30 NOTE — PROGRESS NOTES
Physical Therapy  Facility/Department: [address] PROGRESSIVE CARE  Daily Treatment Note  This note serves as D/C summary if patient is discharged prior to next treatment session. NAME: Aries Dewey  : 1945  MRN: [id number]    Date of Service: 2019    Discharge Recommendations:  Patient would benefit from continued therapy after discharge, 5-7 sessions per week   PT Equipment Recommendations  Other: Will continue to assess    Assessment   Body structures, Functions, Activity limitations: Decreased functional mobility ; Decreased strength;Decreased safe awareness;Decreased balance  Assessment: Pt demonstrated improved stability during ambulation with/without walker support, and unsupported standing balance exercises. He had difficulty consistently clearing RLE from floor during ambulation, but moved with improved trunk control. He would benefit from continued therapy to maximize his standing balance, endurance, and independence ambulating. Anticipate transfer to rehab today. Aries Dewey scored a 17/24 on the AM-PAC short mobility form. Current research shows that an AM-PAC score of 17 or less is typically not associated with a discharge to the patient's home setting. Based on the patients AM-PAC score and their current functional mobility deficits, it is recommended that the patient have 5-7 sessions per week of Physical Therapy at d/c to increase the patients independence. Treatment Diagnosis: Decreased functional mobility;  Difficulty walking; Decreased balance  Specific instructions for Next Treatment: Progress ambulation with LRAD; improve balance and RLE strength  Decision Making: Medium Complexity  PT Education: Goals;Transfer Training;General Safety;Gait Training;Functional Mobility Training  Activity Tolerance  Activity Tolerance: Patient limited by fatigue;Patient Tolerated treatment well     Patient Diagnosis(es): The primary encounter diagnosis was Facial droop due to acute stroke

## 2019-08-30 NOTE — PROGRESS NOTES
Comprehension intact; follows simple commands  Cranial nerves:   CN2: visual fields full w/o extinction on confrontational testing  CN 3,4,6: extraocular muscles intact. Pupils are equal, round, reactive bilaterally. CN7: decreased right NLF w/ mild dysarthria. CN8: hearing grossly intact  CN12: tongue midline with protrusion  Strength: mild right sided weakness. Sensory: light touch intact in all 4 extremities. Cerebellar/coordination: finger nose finger without any overt ataxia  Tone: normal in all 4 extremities  Gait: deferred at this time. ROS  Constitutional- No weight loss or fevers  Eyes- No diplopia. No photophobia. Ears/nose/throat- No dysphagia. + Dysarthria  Cardiovascular- No palpitations. No chest pain  Respiratory- No dyspnea. No Cough  Gastrointestinal- No Abdominal pain. No Vomiting. Genitourinary- No incontinence. No urinary retention  Musculoskeletal- No myalgia. No arthralgia  Skin- No rash. No easy bruising. Psychiatric- No depression. No anxiety  Endocrine- No diabetes. No thyroid issues. Hematologic- No bleeding difficulty. No fatigue  Neurologic- + weakness. No Headache. Labs  HgA1c 4.7      Studies  MRI brain w/o contrast 8/29/19, independently reviewed  Acute left basal ganglia infarct w/ petechial hemorrhage. CTA head/neck 8/28/19, independently reviewed  50% left ICA stenosis. 20% right ICA stenosis. ECHO 8/29/19  EF 55%  Dilated left atrium. Impression  1. Right sided weakness w/ dysarthria, found to have an acute left basal ganglia infarct w/ mild petechial hemorrhage. Consider role of HTN versus embolic source. Recommendations  1. HAILY would be reasonable. 2.  81 mg aspirin daily. 3.  Statin. LDL < 70.    4.  Gradual BP reduction to a goal of < 140/90.    5.  Telemetry while inpatient. If PAF is not identified here, would recommend loop recorder. 6.  Maintain normoglycemia. 7.  Rehabilitation efforts.       Will sign

## 2019-08-30 NOTE — DISCHARGE SUMMARY
succinate (TOPROL XL) 50 MG extended release tablet Take 50 mg by mouth every evening              The patient was seen and examined on day of discharge and this discharge summary is in conjunction with any daily progress note from day of discharge. Hospital Course: The patient is a 69year old male, with a past medical history significant for HTN, who presented to [facility] [place] with 1 day history of right arm weakness, facial droop and dysarthria. Symptoms began the morning of admission. The patient was found at the bottom of the stairs and was unable to ambulate. The previous evening, the patient had gone to bed around 8 PM - this was his last known normal.     Imaging studies were confirmatory for an acute infarct. The patient was admitted for further workup and treatment. Neurology was consulted. Echo cardiogram was ordered - no acute findings/shunt, EF of 55%. The patient was seen by PT/OT/SLP. The only significant deficit that the patient displayed was trouble with word finding. This improved throughout his hospital stay. Loop recorder was implanted by EP on the date of discharge. The patient and family had requested acute rehab. PM&R was consulted. At this time, the patient is able to discharge to acute rehab in stable condition for continued therapies. Exam:     BP (!) 149/54   Pulse 73   Temp 98 °F (36.7 °C) (Oral)   Resp 17   Ht 5' 10\" (1.778 m)   Wt 180 lb 1.9 oz (81.7 kg)   SpO2 99%   BMI 25.84 kg/m²     General: Alert and oriented. Sitting up at bedside in no apparent distress. Very pleasant and cooperative. Family is at bedside. Improvement with word finding. HEENT: Normocephalic. Atraumatic. Pupils equal and reactive. EOM intact. Oral mucosa pink/moist/intact. Neck: Supple. Symmetrical. Trachea midline. Lungs: Clear to auscultation bilaterally. Respirations even and unlabored. Chest: Exam unremarkable. Cardiac: S1/S2 noted. Regular Rhythm and rate. Abdomen/GI: Soft. Non-tender. Non-distended. BS+. Extremities: PP+. Atraumatic. No redness/cyanosis/edema noted. Brisk cap refill. Skin: Dry and intact. No lesions noted. Neuro: Good strength to all extremities. Expressive aphasia is improving. Otherwise, no significant focal deficits noted. .    Consults:     IP CONSULT TO STROKE TEAM  IP CONSULT TO PHARMACY  IP CONSULT TO HOSPITALIST  IP CONSULT TO NEUROLOGY  IP CONSULT TO PHYSICAL MEDICINE REHAB  IP CONSULT TO CARDIOLOGY    Significant Diagnostic Studies:     MRI BRAIN W WO CONTRAST   Preliminary Result   Acute left basal ganglia infarct. Associated gradient susceptibility   suggests petechial hemorrhage. Severe background chronic microvascular ischemic disease. The findings were sent to the Radiology Results [id number] at 11:59   am on 8/29/2019 to be communicated to a licensed caregiver. XR CHEST PORTABLE   Final Result   Cardiomegaly with bibasilar hypoaeration. Otherwise, no acute abnormalities   seen in the chest         CTA HEAD NECK W CONTRAST   Final Result   1. Approximately 50% left ICA stenosis by NASCET criteria due to mixed   atherosclerotic plaque. 2. Approximately 20% right ICA stenosis by NASCET criteria due to eccentric   calcified atherosclerotic plaque. 3. No hemodynamically significant stenosis or branch occlusion in the   intracranial arterial circulation. CT HEAD WO CONTRAST   Final Result   No intracranial hemorrhage. Low-density changes of the left basal ganglia may be related to recent   infarct, especially upon discussion with the ordering physician and   consideration of the patient's right-sided symptomatology and timing of the   symptom onset. Stroke alert results were called by Dr. Lizeth Lopez.  MD Silver to [facility] [address]   on 8/28/2019 at 14:21.           5/29/19 Echo   Conclusions      Summary   Left ventricular cavity size is normal.   Normal left ventricular wall thickness.   Ejection fraction is visually estimated to be 55%.   No regional wall motion abnormalities are noted.   Mitral valve is structurally normal.   Trivial to mild mitral regurgitation.   The aortic valve is structurally normal.   Mild aortic regurgitation.   No evidence of aortic valve stenosis.   Normal right ventricular size and function. Labs: For convenience and continuity at follow-up the following most recent labs are provided:    CBC:    Lab Results   Component Value Date    WBC 5.3 08/30/2019    HGB 14.5 08/30/2019    HCT 40.7 08/30/2019     08/30/2019       Renal:    Lab Results   Component Value Date     08/30/2019    K 3.7 08/30/2019    CL 98 08/30/2019    CO2 22 08/30/2019    BUN 10 08/30/2019    CREATININE 0.6 08/30/2019    CALCIUM 9.2 08/30/2019       No future appointments. Time Spent on discharge is more than 45 minutes in the examination, evaluation, counseling and review of medications and discharge plan. RX monitoring reviewed N/A    Signed:    DOROTHY Grace NP   8/30/2019      Thank you Yadi Gray for the opportunity to be involved in this patient's care. If you have any questions or concerns please feel free to contact me at [phone].

## 2019-08-30 NOTE — DISCHARGE INSTR - COC
SECTION    Prognosis: Good    Condition at Discharge: Stable    Rehab Potential (if transferring to Rehab): Good    Recommended Labs or Other Treatments After Discharge:   Please call and schedule an appointment with your Primary Care Provider Dr. Eleanor Hernandez at [phone] for a follow up visit within 1 week. ARU for continued therapies prior to discharge home. PT/OT/SLP. [address] [place] [address]  Debbie Gonzalez [phone]    Schedule an appointment as soon as possible for a visit    Shari Del Valle [address] [place] [address]  [address] [address]  [phone]    Schedule an appointment as soon as possible for a visit    Take medications as prescribed. - If you have questions about your medications and/or changes to your medications, please ask your hospital provider and/or your primary care provider. Return to the emergency room for fever, cough, chest pain or worsening symptoms. Physician Certification: I certify the above information and transfer of Renee Casanova  is necessary for the continuing treatment of the diagnosis listed and that he requires Acute Rehab for less 30 days.      Update Admission H&P: No change in H&P    PHYSICIAN SIGNATURE:   Electronically signed by DOROTHY Hernandez NP on 8/30/19 at Ryan Ruff MD

## 2019-08-30 NOTE — PROCEDURES
hemostasis. Specimen collected: none    Estimated blood loss: < 5 cc    The patient tolerated the procedure well without any complications. Device information:   The device is a Boomrat Reveal LINQ [id number] SN# [id number] with implant date: 8/30/2019  The device was programmed to detect:   VT: 380 ms 16 beats   Bradycardia: 2000 ms     Impression:    Pre- and post-procedural diagnoses of cerebrovascular accident of unclear etiology with successful implantation of a [employer]onic Implantable Loop Recorder. Plan:   The patient will have usual post-implant care with a 1-week wound check with our nurse in the [facility] at [facility]. From an EP perspective, the patient may be discharged home today if the patient remains stable. Follow-up also includes routine device interrogation with the Device Clinic. Thank you for allowing us to participate in the care of your patient. If you have any questions or comments, please feel free to contact us.     Yoan John MD, MS, [facility], [facility]  Cardiac Electrophysiology  [address]  [address] [facility], [facility] Carlos Romero [facility] [address]  [phone]

## 2019-08-30 NOTE — PROGRESS NOTES
Occupational Therapy  Facility/Department: [address] PROGRESSIVE CARE  Daily Treatment Note  NAME: Arnel Lawson  : 1945  MRN: [id number]    Date of Service: 2019    Discharge Recommendations:  Patient would benefit from continued therapy after discharge, 5-7 sessions per week  OT Equipment Recommendations  Other: TBD  Arnel Lawson scored a 16/24 on the AM-PAC ADL Inpatient form. Current research shows that an AM-PAC score of 17 or less is typically not associated with a discharge to the patient's home setting. Based on the patients AM-PAC score and their current ADL deficits, it is recommended that the patient have 5-7 sessions per week of Occupational Therapy at d/c to increase the patients independence. Assessment   Performance deficits / Impairments: Decreased functional mobility ; Decreased ADL status; Decreased strength;Decreased endurance;Decreased balance  Assessment: Pt tolerated tx session well and is beginning to progress toward set goals. Pt completed functional transfers and mobility in room with CGA, RW, min cueing for safety. Pt still has noticeable R side weakness and decreased balance, however is improving compared to previous session. Pt completed oral care at the sink with CGA. Pt completed seated B UE therex to increase strength and endurance, tolerated well with R side weakness noted. Will recommend high-frequency skilled therapy at d/c to continue to progress pt back to PLOF of independence. Continue per OT POC  Prognosis: Good  OT Education: OT Role;Plan of Care;Transfer Training;Family Education  Patient Education: discussed rehab process with family  Barriers to Learning: cognition  REQUIRES OT FOLLOW UP: Yes  Activity Tolerance  Activity Tolerance: Patient Tolerated treatment well;Patient limited by fatigue  Safety Devices  Safety Devices in place: Yes  Type of devices: Left in chair;Call light within reach;Gait belt;Nurse notified; Chair alarm in place; Patient at risk for falls(KELY Bowers)         Patient Diagnosis(es): The primary encounter diagnosis was Facial droop due to acute stroke ([facility] [address] Diagnoses of Aphasia and Dysarthria were also pertinent to this visit. has a past medical history of Hypertension. has a past surgical history that includes Prostate surgery (2004). Restrictions  Restrictions/Precautions  Restrictions/Precautions: Fall Risk  Position Activity Restriction  Other position/activity restrictions: General diet, nectar thickened liquids; dysphagia and dysarthria  Subjective   General  Chart Reviewed: Yes  Patient assessed for rehabilitation services?: Yes  Additional Pertinent Hx: Per H&P: \"73 y.o. male who presented to [facility] [place] with 1 day history of right arm weakness and facial droop and dysarthria. Symptoms appear to have started this morning. Patient was found at the bottom of the stairs and unable to ambulate. Patient went to bed around 8 Pm yesterday evening this was his last known well. Patient had expressive aphasia and some dysarthria and was brought to the hospital past the window for TPA. \"  Family / Caregiver Present: (daughter)  Referring Practitioner: Pratik Murdock MD  Diagnosis: Acute ischemic stroke likely left basal ganglia  Subjective  Subjective: Pt met bedside for OT tx. Pt up in recliner upon OT arrival, finishing breakfast and agreeable to therapy. Denies pain, reports he is feeling much better than yesterday      Orientation  Orientation  Overall Orientation Status: Within Normal Limits  Objective    ADL  Feeding:  Thickened liquids;Setup  Grooming: Contact guard assistance(To perform oral care standing at sink)        Balance  Sitting Balance: Stand by assistance  Standing Balance: Contact guard assistance  Functional Mobility  Functional - Mobility Device: Rolling Walker  Activity: To/from bathroom  Assist Level: Contact guard assistance  Functional Mobility Comments: Pt performed functional mobility from chair > < BR

## 2019-08-31 LAB
ANION GAP SERPL CALCULATED.3IONS-SCNC: 13 MMOL/L (ref 3–16)
BUN BLDV-MCNC: 16 MG/DL (ref 7–20)
CALCIUM SERPL-MCNC: 9 MG/DL (ref 8.3–10.6)
CHLORIDE BLD-SCNC: 96 MMOL/L (ref 99–110)
CO2: 24 MMOL/L (ref 21–32)
CREAT SERPL-MCNC: 0.9 MG/DL (ref 0.8–1.3)
GFR AFRICAN AMERICAN: >60
GFR NON-AFRICAN AMERICAN: >60
GLUCOSE BLD-MCNC: 103 MG/DL (ref 70–99)
GLUCOSE BLD-MCNC: 139 MG/DL (ref 70–99)
HCT VFR BLD CALC: 39.2 % (ref 40.5–52.5)
HEMOGLOBIN: 14.2 G/DL (ref 13.5–17.5)
MAGNESIUM: 2.2 MG/DL (ref 1.8–2.4)
MCH RBC QN AUTO: 33.4 PG (ref 26–34)
MCHC RBC AUTO-ENTMCNC: 36.1 G/DL (ref 31–36)
MCV RBC AUTO: 92.4 FL (ref 80–100)
PDW BLD-RTO: 14.2 % (ref 12.4–15.4)
PERFORMED ON: ABNORMAL
PLATELET # BLD: 145 K/UL (ref 135–450)
PMV BLD AUTO: 7 FL (ref 5–10.5)
POTASSIUM REFLEX MAGNESIUM: 4.2 MMOL/L (ref 3.5–5.1)
RBC # BLD: 4.25 M/UL (ref 4.2–5.9)
SODIUM BLD-SCNC: 133 MMOL/L (ref 136–145)
WBC # BLD: 6.8 K/UL (ref 4–11)

## 2019-08-31 PROCEDURE — 1280000000 HC REHAB R&B

## 2019-08-31 PROCEDURE — 97161 PT EVAL LOW COMPLEX 20 MIN: CPT

## 2019-08-31 PROCEDURE — 6360000002 HC RX W HCPCS: Performed by: PHYSICAL MEDICINE & REHABILITATION

## 2019-08-31 PROCEDURE — 97110 THERAPEUTIC EXERCISES: CPT

## 2019-08-31 PROCEDURE — 97166 OT EVAL MOD COMPLEX 45 MIN: CPT

## 2019-08-31 PROCEDURE — 80048 BASIC METABOLIC PNL TOTAL CA: CPT

## 2019-08-31 PROCEDURE — 97112 NEUROMUSCULAR REEDUCATION: CPT

## 2019-08-31 PROCEDURE — 6370000000 HC RX 637 (ALT 250 FOR IP): Performed by: PHYSICAL MEDICINE & REHABILITATION

## 2019-08-31 PROCEDURE — 83735 ASSAY OF MAGNESIUM: CPT

## 2019-08-31 PROCEDURE — 97535 SELF CARE MNGMENT TRAINING: CPT

## 2019-08-31 PROCEDURE — 97530 THERAPEUTIC ACTIVITIES: CPT

## 2019-08-31 PROCEDURE — 94760 N-INVAS EAR/PLS OXIMETRY 1: CPT

## 2019-08-31 PROCEDURE — 92610 EVALUATE SWALLOWING FUNCTION: CPT

## 2019-08-31 PROCEDURE — 36415 COLL VENOUS BLD VENIPUNCTURE: CPT

## 2019-08-31 PROCEDURE — 85027 COMPLETE CBC AUTOMATED: CPT

## 2019-08-31 RX ADMIN — ENOXAPARIN SODIUM 40 MG: 40 INJECTION SUBCUTANEOUS at 09:40

## 2019-08-31 RX ADMIN — METOPROLOL SUCCINATE 50 MG: 50 TABLET, EXTENDED RELEASE ORAL at 20:22

## 2019-08-31 RX ADMIN — POLYETHYLENE GLYCOL 3350 17 G: 17 POWDER, FOR SOLUTION ORAL at 09:40

## 2019-08-31 RX ADMIN — ASPIRIN 81 MG: 81 TABLET ORAL at 09:40

## 2019-08-31 RX ADMIN — SENNOSIDES, DOCUSATE SODIUM 2 TABLET: 50; 8.6 TABLET, FILM COATED ORAL at 09:40

## 2019-08-31 RX ADMIN — ROSUVASTATIN CALCIUM 40 MG: 10 TABLET, FILM COATED ORAL at 20:22

## 2019-08-31 ASSESSMENT — PAIN SCALES - GENERAL
PAINLEVEL_OUTOF10: 0

## 2019-08-31 NOTE — PLAN OF CARE
positioning, skin/wound care, pressure relief instruction,dressing changes,  infection protection, DVT prophylaxis, and/or assistance with in room safety with transfers to bed, toilet, wheelchair, shower as well as bathroom activities and hygiene. Patient/caregiver education for:   [x] Disease/sustained injury/management      [x] Medication Use   [x] Surgical intervention   [x] Safety   [x] Body mechanics and or joint protection   [x] Health maintenance         PHYSICAL THERAPY:  Goals:                  Short term goals  Time Frame for Short term goals: In 3-5 days pt will perform  Short term goal 1: Bed mobility Supervision  Short term goal 2: Transfers SBA  Short term goal 3: Ambulation 150' with LRAD, SBA  Short term goal 4: Ascend/Descend 12 steps with SBA  Short term goal 5: Ascend/Descend curb step with SBA            Long term goals  Time Frame for Long term goals : In 7-10 days pt will perform  Long term goal 1: Bed mobility (I)  Long term goal 2: Transfers Mod I/(I)  Long term goal 3: Ambulation 80' with LRAD, Mod I/(I)  Long term goal 4: Ascend/Descend 12 steps Mod I/(I)  Long term goal 5: Ascend/Descend curb step with LRAD, Mod I/(I)  These goals were reviewed with this patient at the time of assessment and Ivania Portillo is in agreement. Plan of Care: Pt to be seen 90 mins per day for 5-6 days/week, for 7-10 days.                    Current Treatment Recommendations: Strengthening, Balance Training, Functional Mobility Training, Transfer Training, Gait Training, Stair training, Neuromuscular Re-education, Cognitive/Perceptual Training, Endurance Training, Home Exercise Program, Safety Education & Training, Patient/Caregiver Education & Training, Equipment Evaluation, Education, & procurement      OCCUPATIONAL THERAPY:  Goals:             Short term goals  Time Frame for Short term goals: 1 week:  Short term goal 1: Supervision for grooming tasks  Short term goal 2: SBA for bathing  Short term goal Short-term Goals: Pt's goal is to improve his \"confusion\" and speak clearer. Timeframe for Short-term Goals: Pt's goal is to improve his \"confusion\" and speak clearer. These goals were reviewed with this patient at the time of assessment and Aries Dewey is in agreement    Plan of Care: Pt to be seen 45  mins per day for one day/week 1-2 weeks. CASE MANAGEMENT:  Goals:   Assist patient/family with discharge planning, patient/family counseling,   and coordination with insurance during ARU stay. Admission Period/Goal FIM SCORES  FIM Admit/Goal Score   Eating/Swallowing 5/6   Grooming 4/6   Bathing 4/6   Upper Body Dressing 5/6   Lower Body Dressing 3/6   Toileting 2/6   Bladder Yudith, Accidents = FIM 6/6   Bowel  Yudith, Accidents = FIM 1/6   Bed/Chair Transfer 3/6   Toilet Transfer 4/6   Tub/Shower Transfer  4/6   Locomotion:  Ambulation (Walk) / Wheelchair:  W = walk , w/c = wheelchair  Distance:   1= 0-49 ft , 2=  ft, 3= 150+ ft Distance: 3   Level of Assist: 4   Mode: w   FIM: 4/6      Stairs 4/6   Comprehension 5/6   Expression 5/6   Social Interaction 5/6   Problem Solving 4/5   Memory 4/5        Aries Dewey will be seen a minimum of 3 hours of therapy per day, a minimum of 5 out of 7 days per week. [] In this rare instance due to the nature of this patient's medical involvement, this patient will be seen 15 hours per week (900 minutes within a 7 day period). Treatments may include therapeutic exercises, gait training, neuromuscular re-ed, transfer training, community reintegration, bed mobility, self care, home mgmt, cognitive training, energy conservation,dysphagia tx, speech/language/communication therapy, group therapy, and patient/family education. In addition, dietician/nutritionist may monitor calorie count as well as intake and collaboratively work with SLP on dietary upgrades. Neuropsychology/Psychology may evaluate and provide necessary support.     Medical

## 2019-08-31 NOTE — PROGRESS NOTES
ROM: Reduced right  Labial Symmetry: Abnormal symmetry right  Lingual ROM: Reduced right  Lingual Symmetry: Abnormal symmetry right  Lingual Strength: Reduced    Oral Phase Dysfunction  Oral Phase  Oral Phase: WFL  Oral Phase  Oral Phase - Comment: Despite some lingual weakness, the pt had adequate labial/lingual strength and coordination for bolus formation and the posterior propulsion of the bolus. There was no pocketing or oral residual after the swallow. Indicators of Pharyngeal Phase Dysfunction   Pharyngeal Phase  Pharyngeal Phase: WFL  Pharyngeal Phase   Pharyngeal: The pt had a timely swallow with no overt s/s of aspiration after the swallow. This was an improvement from the acute eval on 8/29/2019. Prognosis  Prognosis  Prognosis for safe diet advancement: excellent  Individuals consulted  Consulted and agree with results and recommendations: Patient;RN    Education  Patient Education: Pt was given the results and rec's of this eval.  Patient Education Response: Verbalizes understanding  Safety Devices in place: Yes  Type of devices: All fall risk precautions in place;Call light within reach; Chair alarm in place; Patient at risk for falls; Left in chair       Therapy Time  SLP Individual Minutes  Time In: 1230  Time Out: [address]  Minutes: [address], M.A./[facility]-SLP #[id number]  8/31/2019 1:27 PM

## 2019-08-31 NOTE — H&P
8/28/2019 at 14:21. Xr Chest Portable    Result Date: 8/28/2019  EXAMINATION: ONE XRAY VIEW OF THE CHEST 8/28/2019 2:18 pm COMPARISON: None. HISTORY: ORDERING SYSTEM PROVIDED HISTORY: AMS TECHNOLOGIST PROVIDED HISTORY: Reason for exam:->AMS Reason for Exam: Extremity Weakness Acuity: Chronic Type of Exam: Ongoing FINDINGS: Cardiomegaly. No focal airspace disease. No pulmonary vascular congestion or edema. No pleural effusion. Bibasilar hypoaeration. Cardiomegaly with bibasilar hypoaeration. Otherwise, no acute abnormalities seen in the chest     Cta Head Neck W Contrast    Result Date: 8/28/2019  EXAMINATION: CTA OF THE HEAD AND NECK WITH CONTRAST 8/28/2019 2:05 pm: TECHNIQUE: CTA of the head and neck was performed with the administration of intravenous contrast. Multiplanar reformatted images are provided for review. MIP images are provided for review. Stenosis of the internal carotid arteries measured using NASCET criteria. Dose modulation, iterative reconstruction, and/or weight based adjustment of the mA/kV was utilized to reduce the radiation dose to as low as reasonably achievable. 3D surface reformatted and curved MIP images were submitted for review subsequent to initial interpretation. COMPARISON: Head CT 08/28/2019 HISTORY: ORDERING SYSTEM PROVIDED HISTORY: STROKE TECHNOLOGIST PROVIDED HISTORY: Reason for Exam: Focal neuro deficit, new, fixed or worsening, less than 3 hours Acuity: Acute Type of Exam: Initial FINDINGS: CTA NECK: AORTIC ARCH/ARCH VESSELS: There is a normal branch pattern of the aortic arch. No significant stenosis is seen of the innominate artery or subclavian arteries. CAROTID ARTERIES: The common carotid arteries are without flow limiting stenosis. Calcified atherosclerotic plaque in the right carotid bulb and proximal internal carotid artery results in about 20% stenosis by NASCET criteria.  There is mixed calcified and noncalcified atherosclerotic plaque in the left carotid ordered prn.        Lexie Ontiveros MD, 8/31/2019, 11:53 AM

## 2019-09-01 PROCEDURE — 6360000002 HC RX W HCPCS: Performed by: PHYSICAL MEDICINE & REHABILITATION

## 2019-09-01 PROCEDURE — 1280000000 HC REHAB R&B

## 2019-09-01 PROCEDURE — 94760 N-INVAS EAR/PLS OXIMETRY 1: CPT

## 2019-09-01 PROCEDURE — 6370000000 HC RX 637 (ALT 250 FOR IP): Performed by: PHYSICAL MEDICINE & REHABILITATION

## 2019-09-01 RX ADMIN — ENOXAPARIN SODIUM 40 MG: 40 INJECTION SUBCUTANEOUS at 08:31

## 2019-09-01 RX ADMIN — ROSUVASTATIN CALCIUM 40 MG: 10 TABLET, FILM COATED ORAL at 21:03

## 2019-09-01 RX ADMIN — LISINOPRIL 5 MG: 5 TABLET ORAL at 08:31

## 2019-09-01 RX ADMIN — METOPROLOL SUCCINATE 50 MG: 50 TABLET, EXTENDED RELEASE ORAL at 21:03

## 2019-09-01 RX ADMIN — ASPIRIN 81 MG: 81 TABLET ORAL at 08:31

## 2019-09-01 ASSESSMENT — PAIN SCALES - GENERAL
PAINLEVEL_OUTOF10: 0
PAINLEVEL_OUTOF10: 0

## 2019-09-02 PROCEDURE — 97530 THERAPEUTIC ACTIVITIES: CPT | Performed by: PHYSICAL THERAPIST

## 2019-09-02 PROCEDURE — 6370000000 HC RX 637 (ALT 250 FOR IP): Performed by: PHYSICAL MEDICINE & REHABILITATION

## 2019-09-02 PROCEDURE — 97116 GAIT TRAINING THERAPY: CPT | Performed by: PHYSICAL THERAPIST

## 2019-09-02 PROCEDURE — 1280000000 HC REHAB R&B

## 2019-09-02 PROCEDURE — 97535 SELF CARE MNGMENT TRAINING: CPT

## 2019-09-02 PROCEDURE — 97110 THERAPEUTIC EXERCISES: CPT | Performed by: PHYSICAL THERAPIST

## 2019-09-02 PROCEDURE — 92523 SPEECH SOUND LANG COMPREHEN: CPT

## 2019-09-02 PROCEDURE — 97530 THERAPEUTIC ACTIVITIES: CPT

## 2019-09-02 PROCEDURE — 6360000002 HC RX W HCPCS: Performed by: PHYSICAL MEDICINE & REHABILITATION

## 2019-09-02 PROCEDURE — 94760 N-INVAS EAR/PLS OXIMETRY 1: CPT

## 2019-09-02 RX ADMIN — METOPROLOL SUCCINATE 50 MG: 50 TABLET, EXTENDED RELEASE ORAL at 20:17

## 2019-09-02 RX ADMIN — ENOXAPARIN SODIUM 40 MG: 40 INJECTION SUBCUTANEOUS at 09:21

## 2019-09-02 RX ADMIN — LISINOPRIL 5 MG: 5 TABLET ORAL at 09:20

## 2019-09-02 RX ADMIN — ROSUVASTATIN CALCIUM 40 MG: 10 TABLET, FILM COATED ORAL at 20:17

## 2019-09-02 RX ADMIN — ASPIRIN 81 MG: 81 TABLET ORAL at 09:21

## 2019-09-02 ASSESSMENT — PAIN SCALES - GENERAL
PAINLEVEL_OUTOF10: 0
PAINLEVEL_OUTOF10: 0

## 2019-09-02 NOTE — PLAN OF CARE
Problem: Falls - Risk of:  Goal: Will remain free from falls  Description  Will remain free from falls    Outcome: Ongoing  Note:   Pt educated on falls precautions and safety. Call light and personal belongings within reach at all times. Non skid socks in use when up. Hourly rounding and alarms active. Goal: Absence of physical injury  Description  Absence of physical injury    Outcome: Ongoing     Problem: Risk for Impaired Skin Integrity  Goal: Tissue integrity - skin and mucous membranes  Description  Structural intactness and normal physiological function of skin and  mucous membranes. 9/2/2019 1036 by Salomon Shipley RN  Note:   Dressing is clean dry and intact. Monitor for any drainange or infection    Outcome: Ongoing  Note:   Able to change positions in bed without assist, no evidence of skin breakdown noted. Waffle cushion in place to chair. Problem: IP BOWEL ELIMINATION  Goal: LTG - patient will utilize adaptive techniques/equipment to complete bowel elimination    Outcome: Ongoing  Goal: LTG - patient will have regular and routine bowel evacuation    Outcome: Ongoing  Goal: STG - patient will be accident free    Outcome: Ongoing  Note:   Toileting encouraged. Has been continent of urine this shift      Problem: IP BLADDER/VOIDING  Goal: LTG - patient will complete bladder elimination    Outcome: Ongoing  Goal: LTG - Patient will utilize adaptive techniques/equipment to complete bladder elimination    Outcome: Ongoing  Goal: LTG - patient will achieve acceptable level of continence    Outcome: Ongoing     Problem: Skin Integrity:  Goal: Will show no infection signs and symptoms  Description  Will show no infection signs and symptoms    Outcome: Ongoing  Goal: Absence of new skin breakdown  Description    Outcome: Ongoing  Note:   Able to change positions in bed without assist, no evidence of skin breakdown noted. Waffle cushion in place to chair.        Problem: ACTIVITY INTOLERANCE/IMPAIRED

## 2019-09-02 NOTE — PROGRESS NOTES
clarity  Dysphagia Diagnosis: Swallow function appears grossly intact  Dysphagia Impression : Pt's swallowing function has improved since the eval on 8/29/2019. Orally, the pt had adequate labial/lingual strength and coordination for bolus formation and posterior propulsion of the bolus. There was no pocketing or oral residual after the swallow. Pharyngeally, the pt had a timely swallow with no overt signs of aspiration or pharyngeal stasis. Dysphagia Outcome Severity Scale: Level 6: Within functional limits/Modified independence      Assessment and Plan:         Acute ischemic stroke, Expressive aphasia- ASA,crestor     HTN- lisinopril,toprol     Bowels: Schedule Miralax + Senna S. Follow bowel movements. Enema or suppository if needed.      Bladder: Check PVR x 3.   [address] if PVR > 200ml or if any volume is > 500 ml.      Pain: Tylenol is ordered prn.          María Salazar MD, 9/2/2019, 1:13 PM

## 2019-09-02 NOTE — PROGRESS NOTES
ADL      Orientation  Orientation  Overall Orientation Status: Within Functional Limits(min difficulty with stating month, but appears to be due to difficulty word finding)  Objective    ADL  Grooming: Setup;Verbal cueing(brushed teeth per self, seated in wheelchair at sink. Shaved with cues for quality, difficulty keeping razor head flat on his face using right hand)  UE Bathing: Setup;Supervision  LE Bathing: Contact guard assistance(crossed legs to reach feet, stood with CGA to stand and bathe buttocks. Seated on shower chair for majority of showe)  UE Dressing: Setup  LE Dressing: Setup;Verbal cueing;Contact guard assistance(able to thread pants/briefs onto feet - cues for right foot first.  Assisted with SHERLEY hose, shoes with cues to cross legs - has long shoe horn, but did not use today. He did struggle with shoes)  Toileting: Contact guard assistance(urinated on commode, CGA to manage pants up/down)        Functional Mobility  Functional - Mobility Device: Rolling Walker  Activity: To/from bathroom  Assist Level: Contact guard assistance  Functional Mobility Comments: short, shuffling steps, knees tend to be in slight flexion  Toilet Transfers  Toilet - Technique: Ambulating  Equipment Used: Grab bars  Toilet Transfer: Contact guard assistance  Toilet Transfers Comments: cues for hand placement  Shower Transfers  Shower - Transfer From: Walker  Shower - Transfer Type: To and From  Shower - Transfer To:  Shower seat with back  Shower - Technique: Ambulating  Shower Transfers: Contact Guard  Shower Transfers Comments: cues for hand placement  Wheelchair Bed Transfers  Wheelchair/Bed - Technique: Ambulating  Equipment Used: Wheelchair  Level of Asssistance: Contact guard assistance                             Cognition  Overall Cognitive Status: Exceptions  Attention Span: Attends with cues to redirect  Safety Judgement: Decreased awareness of need for safety  Problem Solving: Assistance required to generate

## 2019-09-02 NOTE — PROGRESS NOTES
Comprehension  Comprehension: Exceptions  Basic Questions: (Ls)  Complex Questions: Mild  One Step Basic Commands: (WFLs)  Two Step Basic Commands: (WFLs with min cues)  Multistep Basic Commands: Mild  Complex/Abstract Commands: Mild  Conversation: Mild  Effective Techniques: Repetition; Slowed speech; Extra processing time;Stressing words    Reading Comprehension  Reading Status: Unable to assess    Expression  Primary Mode of Expression: Verbal    Verbal Expression  Verbal Expression: Exceptions to functional limits  Repetition: ([facility])  Automatic Speech: Will Brooks)  Confrontation: ([facility] for basic pictures/obj)  Convergent: (concrete: 100%; abstract 33%)  Divergent: Severe  Responsive: ([facility])  Conversation: (mild-mod)  Interfering Components: Impaired thought organization  Effective Techniques: Provide extra time; Word retrived strategies    Written Expression  Dominant Hand: Right  Written Expression: Unable to assess    Motor Speech  Motor Speech: Exceptions to [facility]  Intelligibility: Mild  Dysarthria : Mild    Pragmatics/Social Functioning  Pragmatics: Within functional limits    Cognition:      Orientation  Overall Orientation Status: Within Functional Limits  Attention  Attention: Exceptions to [facility]  Selective Attention: Mild  Sustained Attention: ([facility])  Memory  Memory: Exceptions to [facility]  Short-term Memory: Moderate  Working Memory: Mild  Problem Solving  Complex Functional Tasks: To be assessed in therapy  Managing Finances: To be assessed in therapy  Managing Medications: To be assessed in therapy  Simple Functional Tasks: Will Brooks)  Verbal Reasoning Skills: Mild  Numeric Reasoning  Numeric Reasoning: Unable to assess  Abstract Reasoning  Abstract Reasoning: Exceptions to [facility]  Convergent Thinking:  Moderate  Divergent Thinking: Severe  Safety/Judgement  Safety/Judgement: Exceptions to [facility]  Insight: Mild      Prognosis:  Speech Therapy Prognosis  Prognosis: Good  Prognosis Considerations: Participation

## 2019-09-02 NOTE — PROGRESS NOTES
Neuromuscular Re-education, Cognitive/Perceptual Training, Endurance Training, Home Exercise Program, Safety Education & Training, Patient/Caregiver Education & Training, Equipment Evaluation, Education, & procurement  Safety Devices  Type of devices:  All fall risk precautions in place, Gait belt, Call light within reach, Chair alarm in place, Left in chair  Restraints  Initially in place: No     Therapy Time   Individual Concurrent Group Co-treatment   Time In 1310         Time Out 1440         Minutes 90         Timed Code Treatment Minutes: [address] [facility], PT # [id number]

## 2019-09-03 PROCEDURE — 6360000002 HC RX W HCPCS: Performed by: PHYSICAL MEDICINE & REHABILITATION

## 2019-09-03 PROCEDURE — 97127 HC SP THER IVNTJ W/FOCUS COG FUNCJ: CPT

## 2019-09-03 PROCEDURE — 97530 THERAPEUTIC ACTIVITIES: CPT

## 2019-09-03 PROCEDURE — 1280000000 HC REHAB R&B

## 2019-09-03 PROCEDURE — 97110 THERAPEUTIC EXERCISES: CPT | Performed by: PHYSICAL THERAPIST

## 2019-09-03 PROCEDURE — 97530 THERAPEUTIC ACTIVITIES: CPT | Performed by: PHYSICAL THERAPIST

## 2019-09-03 PROCEDURE — 97110 THERAPEUTIC EXERCISES: CPT

## 2019-09-03 PROCEDURE — 97535 SELF CARE MNGMENT TRAINING: CPT

## 2019-09-03 PROCEDURE — 97116 GAIT TRAINING THERAPY: CPT

## 2019-09-03 PROCEDURE — 92507 TX SP LANG VOICE COMM INDIV: CPT

## 2019-09-03 PROCEDURE — 6370000000 HC RX 637 (ALT 250 FOR IP): Performed by: PHYSICAL MEDICINE & REHABILITATION

## 2019-09-03 PROCEDURE — 94760 N-INVAS EAR/PLS OXIMETRY 1: CPT

## 2019-09-03 PROCEDURE — 97116 GAIT TRAINING THERAPY: CPT | Performed by: PHYSICAL THERAPIST

## 2019-09-03 RX ADMIN — POLYETHYLENE GLYCOL 3350 17 G: 17 POWDER, FOR SOLUTION ORAL at 08:45

## 2019-09-03 RX ADMIN — ROSUVASTATIN CALCIUM 10 MG: 10 TABLET, FILM COATED ORAL at 21:16

## 2019-09-03 RX ADMIN — ROSUVASTATIN CALCIUM 40 MG: 10 TABLET, FILM COATED ORAL at 20:41

## 2019-09-03 RX ADMIN — METOPROLOL SUCCINATE 50 MG: 50 TABLET, EXTENDED RELEASE ORAL at 20:41

## 2019-09-03 RX ADMIN — ASPIRIN 81 MG: 81 TABLET ORAL at 08:45

## 2019-09-03 RX ADMIN — ENOXAPARIN SODIUM 40 MG: 40 INJECTION SUBCUTANEOUS at 08:45

## 2019-09-03 RX ADMIN — LISINOPRIL 5 MG: 5 TABLET ORAL at 08:45

## 2019-09-03 ASSESSMENT — PAIN SCALES - GENERAL
PAINLEVEL_OUTOF10: 0
PAINLEVEL_OUTOF10: 0

## 2019-09-03 NOTE — PLAN OF CARE
Problem: Falls - Risk of:  Goal: Will remain free from falls  Description  Will remain free from falls  8/31/2019 1302 by Emily Gaitan, RN  Outcome: Ongoing  Note:   Fall risk band on patient. Orange light on outside of room. Non skid footwear in place. Alarms used appropriately. Patient instructed to call and wait for staff before getting up. Rounding done to anticipate needs. Appropriate safety devices used for transfers. Problem: Risk for Impaired Skin Integrity  Goal: Tissue integrity - skin and mucous membranes  Description  Structural intactness and normal physiological function of skin and  mucous membranes. 8/31/2019 1302 by Emily Gaitan, RN  Outcome: Ongoing  Note:   Skin assessment completed on admission and every shift. Barrier wipes used in the event of incontinence. Pressure relief techniques used as needed while in chair and in bed. Position changes encouraged at least every two hours while in bed. Problem: ACTIVITY INTOLERANCE/IMPAIRED MOBILITY  Goal: Mobility/activity is maintained at optimum level for patient  Outcome: Ongoing  Note:   Patient working with therapy at least 3 hours/day to obtain maximal mobility. Safety devices used.

## 2019-09-03 NOTE — PATIENT CARE CONFERENCE
Contact guard assistance  Toilet Transfers Comments: cues for hand placement     Shower Transfers  Shower - Transfer From: Joao Martinez - Transfer Type: To and From  Shower - Transfer To: Shower seat with back  Shower - Technique: Ambulating  Shower Transfers: Contact Guard  Shower Transfers Comments: declined due to shower yesterday      FIMS:  Eatin - Feeds self with adaptive equipment/dentures and/or feeds self with modified diet and/or performs own tube feeding  Groomin - Requires setup/cues to do all tasks  Bathin - Able to bathe 8-9 areas  Dressing-Upper: 5 - Requires setup/supervision/cues and/or requires assist with presthesis/brace only  Dressing-Lower: 4 - Requires assist with buttons/zippers/shoelaces and/or assist with shoes only  Toiletin - Requires steadying assistance only  Toilet Transfer: 5 - Requires setup/supervision/cues  Tub Transfer: 4 - Minimal contact assistance, pt. expends 75% or more effort         Assessment: Pt has improved to CGA for bathing on shower chair, grab bar for balance. Dresses UB with set up, LB with CGA, cues for left neglect, safety. Transfers and functional mobility with CGA using rolling walker, but cues for hand placement, safety, right side awareness. He has decreased insight into right neglect, decreased balance. Pt is pleasant and cooperative, motivated to improve. NUTRITION  Most recent weightWeight: 191 lb 9.3 oz (86.9 kg)  BSA (Calculated - sq m): 2.02 sq meters  BMI (Calculated): 27.5  Please see nutrition note for details. NURSING  Bladder 5, bowel 7, monitor and maintain skin integrity, loop recorder site. Family Education: Patient education: CVA, diet and swallowing precautions, medications, skin/incision care, pain control as needed, safety/fall prevention.     MEDICAL  BP a little elevated   No complaints of pain   Limited by right neglect and poor insight     TEAM SUMMARY AND DISCHARGE PLAN  Estimated Length of Stay:[place]

## 2019-09-03 NOTE — PROGRESS NOTES
Physical Therapy  Facility/Department: [address] IP REHAB  Daily Treatment Note- PM session  NAME: Aries Dewey  : 1945  MRN: [id number]    Date of Service: 9/3/2019    Discharge Recommendations:  Patient would benefit from continued therapy after discharge, 5-7 sessions per week   PT Equipment Recommendations  Equipment Needed: No  Other: reports wife has wheeled walker he can use    Assessment   Body structures, Functions, Activity limitations: Decreased functional mobility ; Decreased ADL status; Decreased strength;Decreased safe awareness;Decreased endurance;Decreased sensation;Decreased balance;Decreased coordination  Assessment: Pt is a 69 y.o. M. admitted  for acute L basal ganglia CVA. Today, he ambulated 200' with a rolling walker and SBA with occasional R foot dragging floor at times (requires verbal cues to pick right foot up higher as he advances right leg). He has met 3/5 short term goals as of today. Patient limnited by R suded weakness, inattention to R side and limited balance. He would benefit from IP Rehab to address these deficits. Anticipate D/C end of week to early next week depending on OT assessment. Treatment Diagnosis: Decreased functional mobility; Difficulty walking; Decreased balance  Specific instructions for Next Treatment: Progress ambulation with LRAD; improve balance and RLE strength  Prognosis: Good  Decision Making: Medium Complexity  History: See below  Exam: Strength; ROM; Balance; Ambulation  Clinical Presentation: Evolving  PT Education: Transfer Training;General Safety;Gait Training;Functional Mobility Training;Home Exercise Program  Patient Education: steps, progress gait  Barriers to Learning: Decreased safety awareness  REQUIRES PT FOLLOW UP: Yes  Activity Tolerance  Activity Tolerance: Patient Tolerated treatment well     Patient Diagnosis(es): There were no encounter diagnoses. has a past medical history of Hypertension.    has a past surgical history that LE x 10, WB with R LE with L LE on 4\" stool held for 30 sec with sway     Exercises  Comments: standing exercisies- mini squatsx 10 , marchesx 20, heel rocks/toe raises x 15, rest, hip abduction and extension x 15, knee flexion x 15         Comment: Pt assisted to the bathroom at end of session. Pants up/down with supervision. Pt performed theodore-care hygiene after bowel movement with supervision. Hand-hygiene with supervision. Pt set up with lunch meal tray at end of session. Goals  Short term goals  Time Frame for Short term goals: In 3-5 days pt will perform  Short term goal 1: Bed mobility Supervision - MET 9/3  Short term goal 2: Transfers SBA - MET 9/3  Short term goal 3: Ambulation 150' with LRAD, SBA- met 9/3  Short term goal 4: Ascend/Descend 12 steps with SBA  Short term goal 5: Ascend/Descend curb step with SBA  Long term goals  Time Frame for Long term goals : In 7-10 days pt will perform  Long term goal 1: Bed mobility (I)  Long term goal 2: Transfers Mod I/(I)  Long term goal 3: Ambulation 150' with LRAD, Mod I/(I)  Long term goal 4: Ascend/Descend 12 steps Mod I/(I)  Long term goal 5: Ascend/Descend curb step with LRAD, Mod I/(I)  Patient Goals   Patient goals : To return home when able    Plan    Plan  Times per week: 5-6x  Times per day: Twice a day  Specific instructions for Next Treatment: Progress ambulation with LRAD; improve balance and RLE strength  Current Treatment Recommendations: Strengthening, Balance Training, Functional Mobility Training, Transfer Training, Gait Training, Stair training, Neuromuscular Re-education, Cognitive/Perceptual Training, Endurance Training, Home Exercise Program, Safety Education & Training, Patient/Caregiver Education & Training, Equipment Evaluation, Education, & procurement  Safety Devices  Type of devices:  All fall risk precautions in place, Gait belt(to OT session)  Restraints  Initially in place: No     Therapy Time   Individual Concurrent Group

## 2019-09-03 NOTE — PROGRESS NOTES
leandra puzzles  IADL Comments: mainly gets carryout for meals. Additional Comments: Denies h/o falls. Plays golf 2x a week      Objective:      Bed mobility  Bridging: Supervision  Rolling to Left: Supervision  Rolling to Right: Supervision  Supine to Sit: Supervision  Sit to Supine: Supervision  Scooting: Supervision    Transfers  Sit to Stand: Stand by assistance  Stand to sit: Stand by assistance(cues for hand placement and to bring walker back to sitting surface completely)    Ambulation  Ambulation?: Yes  More Ambulation?: No  Surface: level tile, carpet  Device: Rolling Walker  Assistance: Stand by assistance  Quality of Gait: brisk barbara, R with reduced foot clearance and stance phase terminating early with R forefoot striking the ground, increased UE support on walker  Distance: 400'  Comments: R foot gets caught on carpet threshold and gym threshold; patient required frequent verbal cues to increase R foot clearance    Stairs  Stairs/Curb  Stairs?: Yes  Stairs  # Steps : 12  Stairs Height: 6\"  Rails: Bilateral  Curbs: 6\"  Device: No Device(walker on curb step)  Assistance: Stand by assistance, Contact guard assistance  Comment: up/down 12 steps with roxy rails and CGA/SBA with reciprocal pattern steadily. Up/down curb step with walker and CGA steadily. Exercises   Comments: SUPINE: right LE SLR 2 x 10 reps, right hip abd 2 x 10 reps, bridge 2 x 10 reps, right knee SAQ 2 x 10 reps. STANDING: R hip flexion x 10 reps, single leg stance on R LE x 10 seconds with CGA and BUE support on walker. Other Activities:  Comment: Pt assisted to the bathroom at end of session. Pants up/down with supervision. Pt performed theodore-care hygiene after bowel movement with supervision. Hand-hygiene with supervision. Pt set up with lunch meal tray at end of session.     Neuro/balance  Sitting - Static: Good  Sitting - Dynamic: Good  Standing - Static: Good  Standing - Dynamic: Fair, +  Comments: Pt able to ascend/descend Training, Patient/Caregiver Education & Training, Equipment Evaluation, Education, & procurement          Timed Code Treatment Minutes: 45 Minutes         Therapy Time    Individual Concurrent Group Co-treatment   Time In 6473            Time Out 1200          Minutes 45            Timed Code Treatment Minutes: 45 Minutes        Electronically signed by Sajan Ingram PT on 9/3/2019 at 11:56 AM

## 2019-09-03 NOTE — PROGRESS NOTES
Pt.sleeping quietly overnight. Denies needs for pain medication. Has been continent of bladder and bowel. SBA with walker for transfer. Does well. Remains NSR on Tele. Refused Senokot tonight due to multiple bm. Call light in reach. Will monitor.

## 2019-09-03 NOTE — PROGRESS NOTES
Department of [facility] Progress Note    Patient Identification:  Yas Eugene  [id number]  : 1945  Admit date: 2019      Diagnosis:   Patient Active Problem List   Diagnosis    [facility]schem[facility] [address] [facility]    Left sided cerebral hemisphere cerebrovascular accident (CVA) ([facility] [address] [facility]           Subjective: Pt seen this AM.  He states he did better in his therapies yesterday with his balance and strength. He feels he is getting stronger in his right side. Speech is working with him on improving his speech ability and word finding skills. He does not have any complaints of pain. He will be discussed in rehab conference tomorrow. BP (!) 145/75   Pulse 62   Temp 98.3 °F (36.8 °C) (Oral)   Resp 16   Ht 5' 10\" (1.778 m)   Wt 191 lb 9.3 oz (86.9 kg)   SpO2 99%   BMI 27.49 kg/m²     Last 24 hour lab  No results found for this or any previous visit (from the past 24 hour(s)). Therapy progress:  PT  Position Activity Restriction  Other position/activity restrictions: General diet, nectar thickened liquids; dysphagia and dysarthria  Objective     Sit to Stand: Contact guard assistance  Stand to sit: Contact guard assistance  Device: Rolling Walker  Assistance: Stand by assistance  Distance: 220', 150'  OT  PT Equipment Recommendations  Other: Will continue to assess  Toilet - Technique: Ambulating  Equipment Used: Grab bars  Toilet Transfers Comments: cues for hand placement          SLP:   Cognitive Diagnosis: Mild-moderate deficits re: attention, reasoning/insight, memory, and inconsistent bouts of confusion.    Aphasia Diagnosis: Expressive aphasia impacted at higher level tasks and conversational discourse and receptive deficits at following multistep directions and conversational discourse  Speech Diagnosis: Dysarthria impacted by perioral weakness impacting speech intelligibility and clarity  Dysphagia Diagnosis: Swallow function appears grossly intact  Dysphagia Impression : Pt's swallowing function has improved since the eval on 8/29/2019. Orally, the pt had adequate labial/lingual strength and coordination for bolus formation and posterior propulsion of the bolus. There was no pocketing or oral residual after the swallow. Pharyngeally, the pt had a timely swallow with no overt signs of aspiration or pharyngeal stasis. Dysphagia Outcome Severity Scale: Level 6: Within functional limits/Modified independence      Assessment and Plan:         Acute ischemic stroke, Expressive aphasia- ASA,crestor     HTN- lisinopril,toprol     Bowels: Schedule Miralax + Senna S. Follow bowel movements. Enema or suppository if needed.      Bladder: Check PVR x 3.   [address] if PVR > 200ml or if any volume is > 500 ml.      Pain: Tylenol is ordered prn.          Maggie Quigley MD, 9/3/2019, 7:11 AM

## 2019-09-03 NOTE — PROGRESS NOTES
-lingual ROM exercises completed x5 reps for lateralization and protrusion  -x5 reps for resistance with tongue depressor  Labial ROM exercises: x10 reps with min cues     Goals: Pt's goal is to improve his \"confusion\" and speak clearer.  -ongoing; pt reports feeling less confused today and having improved speech       Goal 1: The pt will improve speech intelligibility in conversation via structured tasks with min cues and >90% intelligibility   -pt was ~90% accurate during conversational discourse; the pt required cues to use speech strategies to improve his speech intelligibility. Pt required cues to improve loudness and articulatory agility       Goal 2: The pt will complete oral motor exercises to improve weakness x10 reps with min cues    -lingual ROM exercises completed x5 reps for lateralization and protrusion  -x5 reps for resistance with tongue depressor  Labial ROM exercises: x10 reps with min cues   -AMR/SMR exercises completed x10 reps with min-mod set up ad min cues to complete   -tongue twister tasks at sentence length: min-moderate cues to improve articulatory precision       Goal 3: The pt will improve VE and thought organization for structured tasks (divergent/convergent) with min cues and 80% -CFN for complex pictures: 100%  -divergent for concrete tasks: for naming fruits 5/10 IND; min-mod to improve to 10 items  -divergent naming for states: 10 items ind'ly      Goal 4: The pt will improve word finding at conversational level with min cues and >80%  -pt with no anomic episodes during conversational discourse this session       Goal 5:  The pt will improve complex question responding and follwoign multi step directions with min cues and >90% -sequencing 4 written steps: 100% accurate with set up cues         Goal 6: The pt will improve STM for recall of 3 unrelated units with min cues and 90%   -STM recall for 3 unrelated words after 10 mins with 1/3 with min cues; unable to improve accuracy even

## 2019-09-04 PROCEDURE — 92507 TX SP LANG VOICE COMM INDIV: CPT

## 2019-09-04 PROCEDURE — 97530 THERAPEUTIC ACTIVITIES: CPT

## 2019-09-04 PROCEDURE — 97112 NEUROMUSCULAR REEDUCATION: CPT

## 2019-09-04 PROCEDURE — 97127 HC SP THER IVNTJ W/FOCUS COG FUNCJ: CPT

## 2019-09-04 PROCEDURE — 6370000000 HC RX 637 (ALT 250 FOR IP): Performed by: PHYSICAL MEDICINE & REHABILITATION

## 2019-09-04 PROCEDURE — 97535 SELF CARE MNGMENT TRAINING: CPT

## 2019-09-04 PROCEDURE — 97110 THERAPEUTIC EXERCISES: CPT

## 2019-09-04 PROCEDURE — 1280000000 HC REHAB R&B

## 2019-09-04 PROCEDURE — 6360000002 HC RX W HCPCS: Performed by: PHYSICAL MEDICINE & REHABILITATION

## 2019-09-04 PROCEDURE — 97116 GAIT TRAINING THERAPY: CPT

## 2019-09-04 PROCEDURE — 94760 N-INVAS EAR/PLS OXIMETRY 1: CPT

## 2019-09-04 RX ADMIN — ROSUVASTATIN CALCIUM 40 MG: 10 TABLET, FILM COATED ORAL at 21:40

## 2019-09-04 RX ADMIN — LISINOPRIL 5 MG: 5 TABLET ORAL at 08:15

## 2019-09-04 RX ADMIN — ASPIRIN 81 MG: 81 TABLET ORAL at 08:15

## 2019-09-04 RX ADMIN — METOPROLOL SUCCINATE 50 MG: 50 TABLET, EXTENDED RELEASE ORAL at 21:40

## 2019-09-04 RX ADMIN — ENOXAPARIN SODIUM 40 MG: 40 INJECTION SUBCUTANEOUS at 08:15

## 2019-09-04 ASSESSMENT — 9 HOLE PEG TEST
TESTTIME_SECONDS: 28.5
TEST_RESULT: IMPAIRED
TEST_RESULT: FUNCTIONAL
TESTTIME_SECONDS: 35.35

## 2019-09-04 ASSESSMENT — PAIN SCALES - GENERAL: PAINLEVEL_OUTOF10: 0

## 2019-09-04 NOTE — PROGRESS NOTES
Physical Therapy  Facility/Department: [address] IP REHAB  Daily Treatment Note  NAME: Gab Hall  : 1945  MRN: [id number]    Date of Service: 2019    Discharge Recommendations:  Continue to assess pending progress, Patient would benefit from continued therapy after discharge   PT Equipment Recommendations  Equipment Needed: No  Other: reports wife has wheeled walker he can use    Assessment   Body structures, Functions, Activity limitations: Decreased functional mobility ; Decreased ADL status; Decreased strength;Decreased safe awareness;Decreased endurance;Decreased sensation;Decreased balance;Decreased coordination  Assessment: Pt is a 69 y.o. M. admitted  for acute L basal ganglia CVA. Today, pt SBA for transfers and SBA ambulating 220'x2 with RW. Pt continues to drag R foot at times, but improves some with cuing. Patient limited by R sided weakness, inattention to R side and limited balance. He would benefit from IP Rehab to address these deficits. Treatment Diagnosis: Decreased functional mobility; Difficulty walking; Decreased balance  Specific instructions for Next Treatment: Progress ambulation with LRAD; improve balance and RLE strength  Prognosis: Good  PT Education: Transfer Training;General Safety;Gait Training;Functional Mobility Training  Patient Education: stair training  REQUIRES PT FOLLOW UP: Yes  Activity Tolerance  Activity Tolerance: Patient Tolerated treatment well     Patient Diagnosis(es): There were no encounter diagnoses. has a past medical history of Hypertension. has a past surgical history that includes Prostate surgery ().     Restrictions  Restrictions/Precautions  Restrictions/Precautions: Fall Risk  Position Activity Restriction  Other position/activity restrictions: General diet, nectar thickened liquids; dysphagia and dysarthria     Social/Functional History  Lives With: Spouse  Type of Home: (condo)  Home Layout: One level, Laundry in basement(bedroom in

## 2019-09-04 NOTE — PROGRESS NOTES
Occupational Therapy  Facility/Department: [address] IP REHAB  Daily Treatment Note  NAME: Aubrey Engel  : 1945  MRN: [id number]    Date of Service: 2019    Discharge Recommendations:  Home with assist PRN, Home with Home health OT       Assessment   Performance deficits / Impairments: Decreased functional mobility ; Decreased ADL status; Decreased high-level IADLs;Decreased strength;Decreased coordination  Assessment: Pt completed simple meal prep with min cues for organization and safety. Able to organize pills in pill keeper indep after initial instruction. Mobility wtih RW and SBA, no device in small area of kitchen. Noted min decreased coordination right hand, but generally uses in function. Plan discharge next week on Tuesday  Treatment Diagnosis: decreased ADL, balance, IADL,   Prognosis: Good  OT Education: IADL Safety  REQUIRES OT FOLLOW UP: Yes  Activity Tolerance  Activity Tolerance: Patient Tolerated treatment well  Safety Devices  Safety Devices in place: Yes  Type of devices: Gait belt(to room per transport at end of )         Patient Diagnosis(es): CVA     has a past medical history of Hypertension. has a past surgical history that includes Prostate surgery (). Restrictions  Restrictions/Precautions  Restrictions/Precautions: Fall Risk  Position Activity Restriction  Other position/activity restrictions: General diet, nectar thickened liquids; dysphagia and dysarthria  Subjective   General  Chart Reviewed: Yes  Additional Pertinent Hx: Per H&P: \"73 y.o. male who presented to [facility] [place] with 1 day history of right arm weakness and facial droop and dysarthria. Symptoms appear to have started this morning. Patient was found at the bottom of the stairs and unable to ambulate. Patient went to bed around 8 Pm yesterday evening this was his last known well.   Patient had expressive aphasia and some dysarthria and was brought to the hospital past the window for

## 2019-09-05 LAB
ANION GAP SERPL CALCULATED.3IONS-SCNC: 11 MMOL/L (ref 3–16)
BUN BLDV-MCNC: 19 MG/DL (ref 7–20)
CALCIUM SERPL-MCNC: 9.2 MG/DL (ref 8.3–10.6)
CHLORIDE BLD-SCNC: 102 MMOL/L (ref 99–110)
CO2: 24 MMOL/L (ref 21–32)
CREAT SERPL-MCNC: 0.7 MG/DL (ref 0.8–1.3)
GFR AFRICAN AMERICAN: >60
GFR NON-AFRICAN AMERICAN: >60
GLUCOSE BLD-MCNC: 100 MG/DL (ref 70–99)
HCT VFR BLD CALC: 37.4 % (ref 40.5–52.5)
HEMOGLOBIN: 13.3 G/DL (ref 13.5–17.5)
MCH RBC QN AUTO: 32.6 PG (ref 26–34)
MCHC RBC AUTO-ENTMCNC: 35.6 G/DL (ref 31–36)
MCV RBC AUTO: 91.6 FL (ref 80–100)
PDW BLD-RTO: 14.5 % (ref 12.4–15.4)
PLATELET # BLD: 159 K/UL (ref 135–450)
PMV BLD AUTO: 7 FL (ref 5–10.5)
POTASSIUM SERPL-SCNC: 4.6 MMOL/L (ref 3.5–5.1)
RBC # BLD: 4.08 M/UL (ref 4.2–5.9)
SODIUM BLD-SCNC: 137 MMOL/L (ref 136–145)
WBC # BLD: 4.1 K/UL (ref 4–11)

## 2019-09-05 PROCEDURE — 92526 ORAL FUNCTION THERAPY: CPT

## 2019-09-05 PROCEDURE — 85027 COMPLETE CBC AUTOMATED: CPT

## 2019-09-05 PROCEDURE — 97535 SELF CARE MNGMENT TRAINING: CPT

## 2019-09-05 PROCEDURE — 97116 GAIT TRAINING THERAPY: CPT

## 2019-09-05 PROCEDURE — 97112 NEUROMUSCULAR REEDUCATION: CPT

## 2019-09-05 PROCEDURE — 36415 COLL VENOUS BLD VENIPUNCTURE: CPT

## 2019-09-05 PROCEDURE — 6370000000 HC RX 637 (ALT 250 FOR IP): Performed by: PHYSICAL MEDICINE & REHABILITATION

## 2019-09-05 PROCEDURE — 6360000002 HC RX W HCPCS: Performed by: PHYSICAL MEDICINE & REHABILITATION

## 2019-09-05 PROCEDURE — 80048 BASIC METABOLIC PNL TOTAL CA: CPT

## 2019-09-05 PROCEDURE — 1280000000 HC REHAB R&B

## 2019-09-05 PROCEDURE — 92507 TX SP LANG VOICE COMM INDIV: CPT

## 2019-09-05 PROCEDURE — 97110 THERAPEUTIC EXERCISES: CPT

## 2019-09-05 PROCEDURE — 97530 THERAPEUTIC ACTIVITIES: CPT

## 2019-09-05 PROCEDURE — 97127 HC SP THER IVNTJ W/FOCUS COG FUNCJ: CPT

## 2019-09-05 RX ADMIN — METOPROLOL SUCCINATE 50 MG: 50 TABLET, EXTENDED RELEASE ORAL at 20:54

## 2019-09-05 RX ADMIN — ROSUVASTATIN CALCIUM 40 MG: 10 TABLET, FILM COATED ORAL at 20:54

## 2019-09-05 RX ADMIN — ENOXAPARIN SODIUM 40 MG: 40 INJECTION SUBCUTANEOUS at 08:25

## 2019-09-05 RX ADMIN — ASPIRIN 81 MG: 81 TABLET ORAL at 08:24

## 2019-09-05 RX ADMIN — LISINOPRIL 5 MG: 5 TABLET ORAL at 08:24

## 2019-09-05 ASSESSMENT — PAIN SCALES - GENERAL
PAINLEVEL_OUTOF10: 0
PAINLEVEL_OUTOF10: 0

## 2019-09-05 NOTE — PROGRESS NOTES
A to correct several posterior LOB. At end of session, pt able to ambulate 100' back to his room without AD, SBA, no LOB. He was able to position himself for comfort in bedside chair, call light in reach, alarm in place. Assessment: Pt practiced ambulation with and without walker support this afternoon. He appeared steadier ambulating without device, and did well completing a variety of gait exercises. Patient limited by R sided weakness, and difficulty sequencing LEs. He would benefit from IP Rehab to address these deficits.        Safety Device - Type of devices:  []  All fall risk precautions in place [] Bed alarm in place  [] Call light within reach [] Chair alarm in place [] Positioning belt [x] Gait belt [] Patient at risk for falls [] Left in bed [] Left in chair [] Telesitter in use [] Sitter present [] Nurse notified []  None      Therapy Time   Individual Co-treatment   Time In 1345     Time Out 1430     Minutes 45         Electronically signed by Bryan Snowden PT [id number] on 9/5/2019 at 1:58 PM

## 2019-09-05 NOTE — PLAN OF CARE
Problem: Falls - Risk of:  Goal: Will remain free from falls  Description  Will remain free from falls  Outcome: Ongoing  Note:   Patient free of falls this shift, all safety precautions in place. Goal: Absence of physical injury  Description  Absence of physical injury  Outcome: Ongoing  Note:   All safety precautions in place including nonskid socks on feet, bed exit alarm on and posey clip attached to patient when in chair, phones and call light in reach, will continue to monitor. Problem: Risk for Impaired Skin Integrity  Goal: Tissue integrity - skin and mucous membranes  Description  Structural intactness and normal physiological function of skin and  mucous membranes. Outcome: Ongoing  Note:   Repositioning every 2 hours when in bed or chair. Problem: Skin Integrity:  Goal: Absence of new skin breakdown  Description  Absence of new skin breakdown  Outcome: Ongoing  Note:   Skin check performed, no signs or symptoms of new skin break down, patient tolerating well. Problem: ACTIVITY INTOLERANCE/IMPAIRED MOBILITY  Goal: Mobility/activity is maintained at optimum level for patient  Outcome: Ongoing  Note:   Participates in all scheduled therapies, is cooperative and has a positive attitude towards achieving set goals.        Problem: COMMUNICATION IMPAIRMENT  Goal: Ability to express needs and understand communication  Outcome: Ongoing  Note:   Encouragement of the use of communication board

## 2019-09-05 NOTE — PROGRESS NOTES
while on ARU [] Vital Stim indicated [] Other:   Estimated discharge date: 9/10/19  ASSESSMENT:  Dysphagia Diagnosis: Swallow function appears grossly intact  Dysphagia Impression : Pt's swallowing function has improved since the eval on 8/29/2019. Orally, the pt had adequate labial/lingual strength and coordination for bolus formation and posterior propulsion of the bolus. There was no pocketing or oral residual after the swallow. Pharyngeally, the pt had a timely swallow with no overt signs of aspiration or pharyngeal stasis. Pt was rec'd regular solids and thin liquids. Cognitive Diagnosis: Mild-moderate deficits re: attention, reasoning/insight, memory, and inconsistent bouts of confusion. Aphasia Diagnosis: Expressive aphasia impacted at higher level tasks and conversational discourse and receptive deficits at following multistep directions and conversational discourse  Speech Diagnosis: Dysarthria impacted by perioral weakness impacting speech intelligibility and clarity     Date: 9/5/[id number]     Tx session 1    Total Timed Code Min 15    Total Treatment Minutes 45    Individual Treatment Minutes 45    Group Treatment Minutes 0    Co-Treat Minutes 0    Variance/Reason:  0    Pain denied    Pain Intervention [] RN notified  [] Repositioned  [] Intervention offered and patient declined  [x] N/A  [] Other: [] RN notified  [] Repositioned  [] Intervention offered and patient declined  [] N/A  [] Other:   Subjective     Pt seen in SLP office  Agreeable to tx      Objective:  Goals     Dysphagia Goals: The patient will tolerate regular consistency solids and thin liquids 10/10 without overt s/s of penetration or aspiration ,     -4oz thin liquid via cup during tx with immediate cough x1    The patient/caregiver will demonstrate understanding of compensatory strategies for improved swallowing safety. ,    -NA     The patient will improve oral motor function via therapeutic oral motor exercises to 5/5 each alarm activated  [] Bed alarm activated  [x] Other: to PT session [] Call light within reach  [] Chair alarm activated  [] Bed alarm activated  [] Other:    Assessment: 9/3- pt seen for initiation for tx; pt requires cues to improve speech intelligibility and articulatory precision. Improving expressive language skills; however more difficulty with higher level word finding tasks. Pt also demonstrates slowly resolving cognitive skills. rec continue SLP tx to address deficits. Plan: Continue as per plan of care.       Additional information:     Interventions used during rehab stay:  [x] Speech/Language Treatment  [] Instruction in HEP [] Group [x] Dysphagia Treatment [x] Cognitive Treatment   [] Other:    Adverse Reactions to Treatment/Significant Change in Status:     Electronically Signed by    Steven Beal MS, CCC-SLP #[id number]  Speech Language Pathologist

## 2019-09-05 NOTE — PROGRESS NOTES
bumped one cup, but did not knock over any cups; occasional cues not to circumduct RLE  NDT Treatment: Lower extremity, Standing  Neuromuscular Comments: WB activities with 4\" stool step ups leading with R LE and descend with L LE x 10, WB with R LE with L LE on 4\" stool held for 30 sec with sway    Patient Education: stair training       Safety Devices: Type of devices: All fall risk precautions in place, Gait belt, Call light within reach, Chair alarm in place, Patient at risk for falls, Left in chair      Assessment: Pt is a 69 y.o. M. admitted 8/30 for acute L basal ganglia CVA. Today, pt SBA for transfers and SBA ambulating 220'x2 with RW. His gait quality is significantly improved over the past 2 days with patient no longer catching R foot on floor in mid-swing (except when fatigued after walking >100'). Patient limited by R sided weakness, inattention to R side and limited balance. He would benefit from IP Rehab to address these deficits. Specific instructions for Next Treatment: Progress ambulation with LRAD; improve balance and RLE strength    Discharge Recommendations: Continue to assess pending progress, Patient would benefit from continued therapy after discharge  PT Equipment Recommendations  Equipment Needed: No  Other: reports wife has wheeled walker he can use      Goals:  Time Frame for Short term goals: In 3-5 days pt will perform  Short term goal 1: Bed mobility Supervision - MET 9/3   Short term goal 2: Transfers SBA - MET 9/3   Short term goal 3: Ambulation 150' with LRAD, SBA- met 9/3  Short term goal 4: Ascend/Descend 12 steps with SBA - MET 9/5  Short term goal 5: Ascend/Descend curb step with SBA - MET 9/5    Long term goals  Time Frame for Long term goals :  In 7-10 days pt will perform  Long term goal 1: Bed mobility (I)  Long term goal 2: Transfers Mod I/(I)  Long term goal 3: Ambulation 150' with LRAD, Mod I/(I)  Long term goal 4: Ascend/Descend 12 steps Mod I/(I)  Long term goal 5: Ascend/Descend curb step with LRAD, Mod I/(I)      Plan:  Times per week: 5-6x;  Times per day: Twice a day  Current Treatment Recommendations: Strengthening, Balance Training, Functional Mobility Training, Transfer Training, Gait Training, Stair training, Neuromuscular Re-education, Cognitive/Perceptual Training, Endurance Training, Home Exercise Program, Safety Education & Training, Patient/Caregiver Education & Training, Equipment Evaluation, Education, & procurement          Timed Code Treatment Minutes: 45 Minutes         Therapy Time    Individual Concurrent Group Co-treatment   Time In 1030            Time Out 1115          Minutes 45            Timed Code Treatment Minutes: 45 Minutes        Electronically signed by Nacho Garcia, PT on 9/5/2019 at 11:14 AM

## 2019-09-06 PROCEDURE — 94760 N-INVAS EAR/PLS OXIMETRY 1: CPT

## 2019-09-06 PROCEDURE — 97110 THERAPEUTIC EXERCISES: CPT | Performed by: PHYSICAL THERAPIST

## 2019-09-06 PROCEDURE — 97530 THERAPEUTIC ACTIVITIES: CPT

## 2019-09-06 PROCEDURE — 1280000000 HC REHAB R&B

## 2019-09-06 PROCEDURE — 6370000000 HC RX 637 (ALT 250 FOR IP): Performed by: PHYSICAL MEDICINE & REHABILITATION

## 2019-09-06 PROCEDURE — 6360000002 HC RX W HCPCS: Performed by: PHYSICAL MEDICINE & REHABILITATION

## 2019-09-06 PROCEDURE — 92507 TX SP LANG VOICE COMM INDIV: CPT

## 2019-09-06 PROCEDURE — 97116 GAIT TRAINING THERAPY: CPT | Performed by: PHYSICAL THERAPIST

## 2019-09-06 PROCEDURE — 97112 NEUROMUSCULAR REEDUCATION: CPT

## 2019-09-06 PROCEDURE — 92526 ORAL FUNCTION THERAPY: CPT

## 2019-09-06 PROCEDURE — 97127 HC OT THER IVNTJ W/FOCUS COG FUNCJ: CPT

## 2019-09-06 PROCEDURE — 97530 THERAPEUTIC ACTIVITIES: CPT | Performed by: PHYSICAL THERAPIST

## 2019-09-06 PROCEDURE — 97127 HC SP THER IVNTJ W/FOCUS COG FUNCJ: CPT

## 2019-09-06 RX ORDER — SENNA AND DOCUSATE SODIUM 50; 8.6 MG/1; MG/1
2 TABLET, FILM COATED ORAL DAILY PRN
Status: DISCONTINUED | OUTPATIENT
Start: 2019-09-06 | End: 2019-09-10 | Stop reason: HOSPADM

## 2019-09-06 RX ORDER — POLYETHYLENE GLYCOL 3350 17 G/17G
17 POWDER, FOR SOLUTION ORAL DAILY PRN
Status: DISCONTINUED | OUTPATIENT
Start: 2019-09-06 | End: 2019-09-10 | Stop reason: HOSPADM

## 2019-09-06 RX ADMIN — ROSUVASTATIN CALCIUM 40 MG: 10 TABLET, FILM COATED ORAL at 20:24

## 2019-09-06 RX ADMIN — METOPROLOL SUCCINATE 50 MG: 50 TABLET, EXTENDED RELEASE ORAL at 20:24

## 2019-09-06 RX ADMIN — LISINOPRIL 5 MG: 5 TABLET ORAL at 07:59

## 2019-09-06 RX ADMIN — ENOXAPARIN SODIUM 40 MG: 40 INJECTION SUBCUTANEOUS at 07:59

## 2019-09-06 RX ADMIN — ASPIRIN 81 MG: 81 TABLET ORAL at 07:59

## 2019-09-06 ASSESSMENT — PAIN SCALES - GENERAL
PAINLEVEL_OUTOF10: 0
PAINLEVEL_OUTOF10: 0

## 2019-09-06 NOTE — PROGRESS NOTES
bottom of the stairs and unable to ambulate. Patient went to bed around 8 Pm yesterday evening this was his last known well. Patient had expressive aphasia and some dysarthria and was brought to the hospital past the window for TPA. \"  Response to previous treatment: Patient with no complaints from previous session  Family / Caregiver Present: No  Referring Practitioner: Soni  Diagnosis: acute ischemic stroke, R side weakness, aphasia and dysarthria  Subjective  Subjective: pt met bedside, agreeable to OT             Objective             Balance  Sitting Balance: Independent  Standing Balance: Stand by assistance  Functional Mobility  Functional - Mobility Device: No device  Assist Level: Stand by assistance  Functional Mobility Comments: close SBA on therapy terrace, in dept, over level and uneven surfaces of patio blocks. cues to  his feet to not scuff feet on the floor. Good obstacle avoidance  Wheelchair Bed Transfers  Wheelchair/Bed - Technique: Ambulating  Equipment Used: Wheelchair  Level of Asssistance: Stand by assistance     Transfers  Sit to stand: Stand by assistance  Stand to sit: Stand by assistance  Transfer Comments: chair with arms        Coordination  Fine Motor: Purdue pegboard, putty ex with min difficulty          Cognition  Overall Cognitive Status: WFL  Problem Solving: Assistance required to generate solutions;Assistance required to identify errors made  Cognition Comment: has word finding problems     Perception  Overall Perceptual Status: (2 pc wooden puzzle with good attn to detail.   Was not able to state strategy to organize pairs into groups of colors to match more easily)  Unilateral Attention: Cues to attend to right side of body                                   Plan   Plan  Times per week: 5-7  Times per day: Twice a day  Plan weeks: [facility]  Specific instructions for Next Treatment: kitchen, standing tasks  Current Treatment Recommendations:

## 2019-09-06 NOTE — PROGRESS NOTES
Physical Therapy  Facility/Department: [address] IP REHAB  Daily Treatment Note  - AM and PM Sessions    NAME: Ivania Portillo  : 1945  MRN: [id number]    Date of Service: 2019    Discharge Recommendations:  Continue to assess pending progress, Patient would benefit from continued therapy after discharge   PT Equipment Recommendations  Equipment Needed: No  Other: wife has wheeled walker he can use, but no progressing to functional mobility without an assistive device    Assessment   Body structures, Functions, Activity limitations: Decreased functional mobility ; Decreased ADL status; Decreased strength;Decreased safe awareness;Decreased endurance;Decreased sensation;Decreased balance;Decreased coordination  Assessment: Patient has met all short term goals and is progressing toward long term goals. Patient required SBA/supervision for transfers and ambulating 220'x2 with wheeled walker. His gait quality is significantly improved over the past several days with patient no longer catching R foot on floor in mid-swing, and patient was able to ambulate community distances with no device and close SBA. Recommend patient continue ambulating with therapies with no assistive device. Patient would continue to benefit from IP Rehab to maximize safety, endurance, balance, strength, and ultimately independence with functional mobility. Anticipate discharge to home with wife and home PT on Tuesday, 9/10. Treatment Diagnosis: Decreased functional mobility;  Difficulty walking; Decreased balance  Specific instructions for Next Treatment: Progress ambulation with LRAD; improve balance and RLE strength  Prognosis: Good  PT Education: Transfer Training;General Safety;Gait Training;Functional Mobility Training;Home Exercise Program  Patient Education: safety with fucntional mobility without an assistive device  REQUIRES PT FOLLOW UP: Yes  Activity Tolerance  Activity Tolerance: Patient Tolerated treatment well     Patient

## 2019-09-06 NOTE — PROGRESS NOTES
squeezes for core strengthening to improve sit<>stand transitions        Hand Dominance  Hand Dominance: Right  Left Hand Strength -  (lbs)  Handle Setting 2: 66, 69, 60--average: 65  Right Hand Strength -  (lbs)  Handle Setting 2: 69, 69, 60--average: 66 lbs           Plan   Plan  Times per week: 5-7  Times per day: Twice a day  Plan weeks: DC [facility]ues [facility]  Specific instructions for Next Treatment: kitchen, standing tasks  Current Treatment Recommendations: Neuromuscular Re-education, Functional Mobility Training, Equipment Evaluation, Education, & procurement, Patient/Caregiver Education & Training, Self-Care / ADL, Safety Education & Training, Strengthening, Endurance Training, Home Management Training    Goals  Short term goals  Time Frame for Short term goals: 1 week:  Short term goal 1: Supervision for grooming tasks  Goal Met  Short term goal 2: SBA for bathing   Goal Met  Short term goal 3: SBA for dressing   Goal Met  Short term goal 4: Modified independent bed mobility   Goal Met  Short term goal 5: SBA for transfers   Goal Met  Short term goal 6: Patient to be able to perform R hand strength/coordination activities with Minimal verbal cues   Goal Met  Long term goals  Time Frame for Long term goals : 2 weeks:  Long term goal 1: Modified independent grooming  Long term goal 2: Supervision for bathing  Long term goal 3: Supervision for dressing  Long term goal 4: Independent bed mobility  Long term goal 5: Supervision for transfers  Long term goals 6: Patient to be independent with R hand strength and coordination activities  Patient Goals   Patient goals :  \"To golf\"       Therapy Time   Individual Concurrent Group Co-treatment   Time In 1030         Time Out 1115         Minutes 45         Timed Code Treatment Minutes: [address], [place] [id number]

## 2019-09-07 PROCEDURE — 1280000000 HC REHAB R&B

## 2019-09-07 PROCEDURE — 97110 THERAPEUTIC EXERCISES: CPT

## 2019-09-07 PROCEDURE — 6370000000 HC RX 637 (ALT 250 FOR IP): Performed by: PHYSICAL MEDICINE & REHABILITATION

## 2019-09-07 PROCEDURE — 97530 THERAPEUTIC ACTIVITIES: CPT

## 2019-09-07 PROCEDURE — 6360000002 HC RX W HCPCS: Performed by: PHYSICAL MEDICINE & REHABILITATION

## 2019-09-07 RX ADMIN — ENOXAPARIN SODIUM 40 MG: 40 INJECTION SUBCUTANEOUS at 09:34

## 2019-09-07 RX ADMIN — METOPROLOL SUCCINATE 50 MG: 50 TABLET, EXTENDED RELEASE ORAL at 21:04

## 2019-09-07 RX ADMIN — ASPIRIN 81 MG: 81 TABLET ORAL at 09:34

## 2019-09-07 RX ADMIN — LISINOPRIL 5 MG: 5 TABLET ORAL at 09:34

## 2019-09-07 RX ADMIN — ROSUVASTATIN CALCIUM 40 MG: 10 TABLET, FILM COATED ORAL at 21:04

## 2019-09-07 ASSESSMENT — PAIN SCALES - GENERAL
PAINLEVEL_OUTOF10: 0
PAINLEVEL_OUTOF10: 0

## 2019-09-08 PROCEDURE — 6370000000 HC RX 637 (ALT 250 FOR IP): Performed by: PHYSICAL MEDICINE & REHABILITATION

## 2019-09-08 PROCEDURE — 94760 N-INVAS EAR/PLS OXIMETRY 1: CPT

## 2019-09-08 PROCEDURE — 1280000000 HC REHAB R&B

## 2019-09-08 PROCEDURE — 6360000002 HC RX W HCPCS: Performed by: PHYSICAL MEDICINE & REHABILITATION

## 2019-09-08 RX ADMIN — ROSUVASTATIN CALCIUM 40 MG: 10 TABLET, FILM COATED ORAL at 20:16

## 2019-09-08 RX ADMIN — ASPIRIN 81 MG: 81 TABLET ORAL at 07:39

## 2019-09-08 RX ADMIN — METOPROLOL SUCCINATE 50 MG: 50 TABLET, EXTENDED RELEASE ORAL at 20:16

## 2019-09-08 RX ADMIN — LISINOPRIL 5 MG: 5 TABLET ORAL at 07:39

## 2019-09-08 RX ADMIN — ENOXAPARIN SODIUM 40 MG: 40 INJECTION SUBCUTANEOUS at 07:39

## 2019-09-08 ASSESSMENT — PAIN SCALES - GENERAL
PAINLEVEL_OUTOF10: 0
PAINLEVEL_OUTOF10: 0

## 2019-09-08 NOTE — PLAN OF CARE
Problem: Falls - Risk of:  Goal: Will remain free from falls  Description  Will remain free from falls  Outcome: Ongoing  Goal: Absence of physical injury  Description  Absence of physical injury  Outcome: Ongoing     Problem: Risk for Impaired Skin Integrity  Goal: Tissue integrity - skin and mucous membranes  Description  Structural intactness and normal physiological function of skin and  mucous membranes.   Outcome: Ongoing     Problem: IP BOWEL ELIMINATION  Goal: LTG - patient will utilize adaptive techniques/equipment to complete bowel elimination  Outcome: Ongoing  Goal: LTG - patient will have regular and routine bowel evacuation  Outcome: Ongoing  Goal: STG - patient will be accident free  Outcome: Ongoing     Problem: IP BLADDER/VOIDING  Goal: LTG - patient will complete bladder elimination  Outcome: Ongoing  Goal: LTG - Patient will utilize adaptive techniques/equipment to complete bladder elimination  Outcome: Ongoing  Goal: LTG - patient will achieve acceptable level of continence  Outcome: Ongoing     Problem: Skin Integrity:  Goal: Will show no infection signs and symptoms  Description  Will show no infection signs and symptoms  Outcome: Ongoing  Goal: Absence of new skin breakdown  Description  Absence of new skin breakdown  Outcome: Ongoing     Problem: ACTIVITY INTOLERANCE/IMPAIRED MOBILITY  Goal: Mobility/activity is maintained at optimum level for patient  Outcome: Ongoing     Problem: COMMUNICATION IMPAIRMENT  Goal: Ability to express needs and understand communication  Outcome: Ongoing

## 2019-09-09 LAB
ANION GAP SERPL CALCULATED.3IONS-SCNC: 14 MMOL/L (ref 3–16)
BUN BLDV-MCNC: 17 MG/DL (ref 7–20)
CALCIUM SERPL-MCNC: 9.7 MG/DL (ref 8.3–10.6)
CHLORIDE BLD-SCNC: 97 MMOL/L (ref 99–110)
CO2: 23 MMOL/L (ref 21–32)
CREAT SERPL-MCNC: 0.7 MG/DL (ref 0.8–1.3)
GFR AFRICAN AMERICAN: >60
GFR NON-AFRICAN AMERICAN: >60
GLUCOSE BLD-MCNC: 94 MG/DL (ref 70–99)
HCT VFR BLD CALC: 42.1 % (ref 40.5–52.5)
HEMOGLOBIN: 14.4 G/DL (ref 13.5–17.5)
MCH RBC QN AUTO: 32.5 PG (ref 26–34)
MCHC RBC AUTO-ENTMCNC: 34.3 G/DL (ref 31–36)
MCV RBC AUTO: 95 FL (ref 80–100)
PDW BLD-RTO: 14.6 % (ref 12.4–15.4)
PLATELET # BLD: 122 K/UL (ref 135–450)
PMV BLD AUTO: 7.8 FL (ref 5–10.5)
POTASSIUM SERPL-SCNC: 4.3 MMOL/L (ref 3.5–5.1)
RBC # BLD: 4.43 M/UL (ref 4.2–5.9)
SODIUM BLD-SCNC: 134 MMOL/L (ref 136–145)
WBC # BLD: 8 K/UL (ref 4–11)

## 2019-09-09 PROCEDURE — 6360000002 HC RX W HCPCS: Performed by: PHYSICAL MEDICINE & REHABILITATION

## 2019-09-09 PROCEDURE — 97116 GAIT TRAINING THERAPY: CPT

## 2019-09-09 PROCEDURE — 80048 BASIC METABOLIC PNL TOTAL CA: CPT

## 2019-09-09 PROCEDURE — 94760 N-INVAS EAR/PLS OXIMETRY 1: CPT

## 2019-09-09 PROCEDURE — 6370000000 HC RX 637 (ALT 250 FOR IP): Performed by: PHYSICAL MEDICINE & REHABILITATION

## 2019-09-09 PROCEDURE — 97530 THERAPEUTIC ACTIVITIES: CPT

## 2019-09-09 PROCEDURE — 85027 COMPLETE CBC AUTOMATED: CPT

## 2019-09-09 PROCEDURE — 97112 NEUROMUSCULAR REEDUCATION: CPT

## 2019-09-09 PROCEDURE — 97127 HC SP THER IVNTJ W/FOCUS COG FUNCJ: CPT

## 2019-09-09 PROCEDURE — 97535 SELF CARE MNGMENT TRAINING: CPT

## 2019-09-09 PROCEDURE — 97110 THERAPEUTIC EXERCISES: CPT

## 2019-09-09 PROCEDURE — 92507 TX SP LANG VOICE COMM INDIV: CPT

## 2019-09-09 PROCEDURE — 1280000000 HC REHAB R&B

## 2019-09-09 PROCEDURE — 36415 COLL VENOUS BLD VENIPUNCTURE: CPT

## 2019-09-09 RX ORDER — METOPROLOL SUCCINATE 50 MG/1
50 TABLET, EXTENDED RELEASE ORAL EVERY EVENING
Qty: 30 TABLET | Refills: 3 | Status: SHIPPED | OUTPATIENT
Start: 2019-09-09

## 2019-09-09 RX ADMIN — ASPIRIN 81 MG: 81 TABLET ORAL at 08:09

## 2019-09-09 RX ADMIN — LISINOPRIL 5 MG: 5 TABLET ORAL at 08:09

## 2019-09-09 RX ADMIN — METOPROLOL SUCCINATE 50 MG: 50 TABLET, EXTENDED RELEASE ORAL at 21:06

## 2019-09-09 RX ADMIN — ROSUVASTATIN CALCIUM 40 MG: 10 TABLET, FILM COATED ORAL at 21:06

## 2019-09-09 RX ADMIN — ENOXAPARIN SODIUM 40 MG: 40 INJECTION SUBCUTANEOUS at 08:09

## 2019-09-09 ASSESSMENT — PAIN SCALES - GENERAL
PAINLEVEL_OUTOF10: 0
PAINLEVEL_OUTOF10: 0

## 2019-09-09 NOTE — PROGRESS NOTES
Patient is a 68 y/o M admitted to rehab with CVA. A/A/O x 4. Transfers with assist of CGA with walker. On general diet, tolerating well. Encouraging fluid intake. Medications taken whole in thickened liquid. On Lovenox for DVT prophylaxis. On room air. Has been continent of urine and stool. LBM 9/9. Denies pain. Loop recorder dressing C/D/I. Chair/bed alarms in use and call light in reach. Will monitor for safety.

## 2019-09-09 NOTE — CARE COORDINATION
[facility]    Received referral for homecare services pending discharge 9/10. Will follow patient for homecare services at discharge.  Electronically signed by Anjelica Leonardo LPN on 9/3/67 at 5:57 PM     .         Anjelica Leonardo LPN  [facility] [address] Transition Nurse  [phone]

## 2019-09-09 NOTE — DISCHARGE INSTR - COC
Continuity of Care Form    Patient Name: Alejandro Harada   :  1945  MRN:  [id number]    Admit date:  2019  Discharge date:  9/10/2019    Code Status Order: Full Code   Advance Directives:   [facility] Documentation     Date/Time Healthcare Directive Type of Healthcare Directive Copy in [address] [facility] [address] Agent's Name Healthcare Agent's Phone Number    19  Yes, patient has an advance directive for healthcare treatment  Durable power of  for health care  Other (Comment) Patient is not sure where it is  Spouse  Mike Conrad  [phone]    [id number]  Yes, patient has an advance directive for healthcare treatment  Durable power of  for health care  No, copy requested from family  [address] [facility]  [phone]          Admitting Physician:  Jeannie Samaniego MD  PCP: Nishatn Soto    Discharging Nurse: [facility] Unit/Room#: J8F-7345/1960-08  Discharging Unit Phone Number: [phone]    Emergency Contact:   Extended Emergency Contact Information  Primary Emergency Contact: [address]y [address] Phone: [phone]  Relation: Spouse  Preferred language: English   needed? No  Secondary Emergency Contact: FLAVIO VALDEZ  LE TOTE Phone: [phone]  Relation: Child  Preferred language: English   needed? No    Past Surgical History:  Past Surgical History:   Procedure Laterality Date    PROSTATE SURGERY         Immunization History: There is no immunization history on file for this patient.     Active Problems:  Patient Active Problem List   Diagnosis Code    Ischemic stroke ([address] I63.9    Left sided cerebral hemisphere cerebrovascular accident (CVA) ([address] I63.9       Isolation/Infection:   Isolation          No Isolation            Nurse Assessment:  Last Vital Signs: /73   Pulse 69   Temp 97.6 °F (36.4 °C) (Oral)   Resp 18   Ht 5' 10\" (1.778 m)   Wt 178 lb 9.2 oz (81 kg)   SpO2 95%

## 2019-09-09 NOTE — PROGRESS NOTES
Right: Independent  Supine to Sit: Independent  Sit to Supine: Independent  Scooting: Independent  Comment: flat therapy mat, no rail    Functional Transfers: Toilet Transfers  Toilet - Technique: Ambulating  Equipment Used: Grab bars  Toilet Transfer: Modified independent  Toilet Transfers Comments: cues for hand placement         Shower Transfers  Shower - Transfer From: Other  Shower - Transfer Type: To and From  Shower - Transfer To: Shower seat with back  Shower - Technique: Ambulating  Shower Transfers: Independent  Shower Transfers Comments: declined due to shower yesterday           Functional Mobility  Functional - Mobility Device: No device  Activity: Retrieve items, Transport items, To/from bathroom  Assist Level: Independent  Functional Mobility Comments: patient completes fxl mobility without AD with SBA from room to therapy gym    Instrumental ADL's  Instrumental ADLs: Yes  Meal Prep  Meal Prep Level: Other  Meal Prep Level of Assistance: Stand by assistance  Meal Preparation: Pt gathered items to prepare egg on stovetop with min cues for organization, but safe with stove. He rinsed spatula , placed in , then placed hot pan on rubber rack of . Cues and assist to remove and cool pan as it may melt the rack coating. Plan another cooking activity prior to discharge. Pt used countertop for balance in kitchen, and was also able to walk across the kitchen with only SBA. (On last kitchen activity was independent, but will need to check again in pt's own environment with home OT           Health Management  Health Management Level of Assistance: Independent  Health Management: pt able to read pill bottle and fill pill keeper indep after he was instructed how to use it    Perception  Overall Perceptual Status: (2 pc wooden puzzle with good attn to detail.   Was not able to state strategy to organize pairs into groups of colors to match more easily)  Unilateral Attention: Cues to attend to right side of body         UE Function:  Hand Dominance  Hand Dominance: Right  Left Hand Strength -  (lbs)  Handle Setting 2: 66, 69, 60--average: 65  Right Hand Strength -  (lbs)  Handle Setting 2: 69, 69, 60--average: 66 lbs  Fine Motor Skills  Left 9-Hole Peg Test: Functional(25.95 with SD 4.54)  Left 9 Hole Peg Test Time (secs): 28.5  Right 9-Hole Peg Test: Impaired(25.79 norm)  Right 9 Hole Peg Test Time (secs): 35.35      Assessment:   Assessment: Pt is able to bathe on shower chair using grab bars indep. Dresses indep, gathering own clothing. Pt transfers and completes functional mobility without device in room independently. Recommend home OT for safety in his own environment.   Pt to be discharged home with wife tuesday 9/10/19  Prognosis: Good  Barriers to Learning: cognition  REQUIRES OT FOLLOW UP: Yes  Discharge Recommendations: Home with assist PRN, Home with Home health OT    Equipment Recommendations:  none         Home Exercise Program  Provided Pt with resistance band to improve activity tolerance, strengthening RUE    Electronically signed by Eduarda Carrasco OT on 9/9/2019 at 5:13 PM

## 2019-09-09 NOTE — PROGRESS NOTES
[facility]  Dr. Tyshawn Hansen Progress Note    Patient Identification:  Alejandro Harada  [id number]  : 1945  Admit date: 2019       Diagnosis:   Patient Active Problem List   Diagnosis    [facility] [address]    Left sided cerebral hemisphere cerebrovascular accident (CVA) ([facility]           Subjective: Pt seen this AM. Patient is feeling better this morning, and he thinks he is improving with his right-sided awareness, balance, and strength. He thinks he is ready for discharge to home tomorrow with continued therapies at home. He is excited about going home tomorrow. He does have his blood pressure medication at home already. We will have him continue with in-home therapies of PT, OT, visiting nurse after discharge. We will fill out his MIGUEL A of meds today for his discharge tomorrow. /73   Pulse 69   Temp 97.6 °F (36.4 °C) (Oral)   Resp 18   Ht 5' 10\" (1.778 m)   Wt 178 lb 9.2 oz (81 kg)   SpO2 95%   BMI 25.62 kg/m²     Last 24 hour lab  No results found for this or any previous visit (from the past 24 hour(s)). Therapy progress:  PT  Position Activity Restriction  Other position/activity restrictions: General diet,dysphagia and dysarthria, per SP - Swallowing Precautions: Small bites/sips;Eat/Feed slowly; Remain upright for 30-45 minutes after meals;Upright as possible for all oral intake  Objective     Sit to Stand: Modified independent  Stand to sit: Modified independent  Bed to Chair: Modified independent(ambulating without an AD)  Device: Rolling Walker  Assistance: Stand by assistance, Supervision  Distance: 220'x2 and short distances in therapy gym with multiple turns to access curb and steps  OT  PT Equipment Recommendations  Equipment Needed: No  Other: wife has wheeled walker he can use, but no progressing to functional mobility without an assistive device  Toilet - Technique: Ambulating  Equipment Used: Grab bars  Toilet Transfers Comments: cues for hand placement          SLP:   Cognitive Diagnosis: Mild-moderate deficits re: attention, reasoning/insight, memory, and inconsistent bouts of confusion. Aphasia Diagnosis: Expressive aphasia impacted at higher level tasks and conversational discourse and receptive deficits at following multistep directions and conversational discourse  Speech Diagnosis: Dysarthria impacted by perioral weakness impacting speech intelligibility and clarity  Dysphagia Diagnosis: Swallow function appears grossly intact  Dysphagia Impression : Pt's swallowing function has improved since the eval on 8/29/2019. Orally, the pt had adequate labial/lingual strength and coordination for bolus formation and posterior propulsion of the bolus. There was no pocketing or oral residual after the swallow. Pharyngeally, the pt had a timely swallow with no overt signs of aspiration or pharyngeal stasis. Dysphagia Outcome Severity Scale: Level 6: Within functional limits/Modified independence      Assessment and Plan:         Acute ischemic stroke, Expressive aphasia- ASA,crestor     HTN- lisinopril,toprol     Bowels: Schedule Miralax + Senna S. Follow bowel movements. Enema or suppository if needed.      Bladder: Check PVR x 3.   [address] if PVR > 200ml or if any volume is > 500 ml.      Pain: Tylenol is ordered prn.          Josh Aguero MD, 9/9/2019, 8:27 AM

## 2019-09-09 NOTE — PROGRESS NOTES
ACUTE REHAB UNIT  SPEECH/LANGUAGE PATHOLOGY      [x] Daily  [] Weekly Care Conference Note  [x] Discharge    Patient:Cam Castro      IXU:[birth year]  RRY:[id number]  Rehab Dx/Hx: Left sided cerebral hemisphere cerebrovascular accident (CVA) ([facility] [I63.9]    Precautions: falls  Home situation: lives with spouse at home  ST Dx: [x] Aphasia  [x] Dysarthria  [] Apraxia   [x] Oropharyngeal dysphagia [x] Cognitive Impairment  [] Other:   Date of Admit: 8/30/2019  Room #: M8R-3435/3255-01  Date: 9/9/2019       Current functional status (updated daily):         Pt being seen for : [x] Speech/Language Treatment  [x] Dysphagia Treatment [x] Cognitive Treatment  [] Other:  Current Diet Order:DIET GENERAL;   Communication: []WFL  [x] Aphasia  [x] Dysarthria  [] Apraxia  [] Pragmatic Impairment [] Non-verbal  [] Hearing Loss  [] Other:   Cognition: [x] WFL  [x] Mild  [] Moderate  [] Severe [] Unable to Assess  [] Other:  Memory: [x] WFL  [x] Mild  [] Moderate  [] Severe [] Unable to Assess  [] Other:  Behavior: [x] Alert  [x] Cooperative  [x]  Pleasant  [] Confused  [] Agitated  [] Uncooperative  [] Distractible [] Motivated  [] Self-Limiting [] Anxious  [] Other:  Endurance:  [x] Adequate for participation in SLP sessions  [] Reduced overall  [] Lethargic  [] Other:  Safety: [] No concerns at this time  [x] Reduced insight into deficits  []  Reduced safety awareness [] Not following call light procedures  [] Unable to Assess  [] Other:  Swallowing Precautions: Small bites/sips;Eat/Feed slowly; Remain upright for 30-45 minutes after meals;Upright as possible for all oral intake  Barriers toward progress: Confusion, Cognitive deficit, Limited insight into deficits, Communication deficit, Upper extremity weakness and Lower extremity weakness  Discharge recommendations:  [] Home independently  [x] Home with assistance [x]  24 hour supervision  [] ECF [] Other:  Continued Tx Upon Discharge: ? [x] Yes [] No [] TBD based on progress while on ARU [] Vital Stim indicated [] Other:   Estimated discharge date: 9/10/19  ASSESSMENT:  Dysphagia Diagnosis: Swallow function appears grossly intact  Dysphagia Impression : Pt's swallowing function has improved since the eval on 8/29/2019. Orally, the pt had adequate labial/lingual strength and coordination for bolus formation and posterior propulsion of the bolus. There was no pocketing or oral residual after the swallow. Pharyngeally, the pt had a timely swallow with no overt signs of aspiration or pharyngeal stasis. Pt was rec'd regular solids and thin liquids. Cognitive Diagnosis: Mild-moderate deficits re: attention, reasoning/insight, memory, and inconsistent bouts of confusion. Aphasia Diagnosis: Expressive aphasia impacted at higher level tasks and conversational discourse and receptive deficits at following multistep directions and conversational discourse  Speech Diagnosis: Dysarthria impacted by perioral weakness impacting speech intelligibility and clarity     Date: 9/9/2019 9046-9477     Tx session 1    Total Timed Code Min 15    Total Treatment Minutes 45    Individual Treatment Minutes 45    Group Treatment Minutes 0    Co-Treat Minutes 0    Variance/Reason:  0    Pain denied    Pain Intervention [] RN notified  [] Repositioned  [] Intervention offered and patient declined  [x] N/A  [] Other: [] RN notified  [] Repositioned  [] Intervention offered and patient declined  [] N/A  [] Other:   Subjective     Pt seen in SLP office  Agreeable to tx  Discussed goals, progress, residual deficits with pt; pt states speech has improved but is not back to baseline and he would like home ST for dysarthria 1-2x per week. SLP will notify MD    Objective:  Goals     Dysphagia Goals:  The patient will tolerate regular consistency solids and thin liquids 10/10 without overt s/s of penetration or aspiration ,     -NOT MET; occasional cough with large bolus of thin or when talking with PO; however, pt

## 2019-09-09 NOTE — PLAN OF CARE
Problem: Falls - Risk of:  Goal: Will remain free from falls  Description  Will remain free from falls  8/31/2019 1302 by Dominick Prieto RN  Outcome: Ongoing  Note:   Fall risk band on patient. Orange light on outside of room. Non skid footwear in place. Alarms used appropriately. Patient instructed to call and wait for staff before getting up. Rounding done to anticipate needs. Appropriate safety devices used for transfers. Problem: Risk for Impaired Skin Integrity  Goal: Tissue integrity - skin and mucous membranes  Description  Structural intactness and normal physiological function of skin and  mucous membranes. 8/31/2019 1302 by Dominick Prieto RN  Outcome: Ongoing  Note:   Skin assessment completed on admission and every shift. Barrier wipes used in the event of incontinence. Pressure relief techniques used as needed while in chair and in bed. Position changes encouraged at least every two hours while in bed. Problem: ACTIVITY INTOLERANCE/IMPAIRED MOBILITY  Goal: Mobility/activity is maintained at optimum level for patient  Outcome: Ongoing  Note:   Patient working with therapy at least 3 hours/day to obtain maximal mobility. Safety devices used.

## 2019-09-09 NOTE — PROGRESS NOTES
treatment: Patient with no complaints from previous session  Family / Caregiver Present: No  Referring Practitioner: Soni  Diagnosis: acute ischemic stroke, R side weakness, aphasia and dysarthria  Subjective  Subjective: pt met bedside, agreeable to OT    Orientation  Orientation  Overall Orientation Status: Within Functional Limits  Objective    ADL  Feeding: Modified independent (dentures)  Grooming: Independent  UE Bathing: Independent  LE Bathing: Modified independent (grab bar, shower chair)  UE Dressing: Independent  LE Dressing: Independent        Functional Mobility  Functional - Mobility Device: No device  Activity: Retrieve items;Transport items; To/from bathroom  Assist Level: Independent  Shower Transfers  Shower - Transfer From: Other  Shower - Transfer Type: To and From  Shower - Transfer To:  Shower seat with back  Shower - Technique: Ambulating  Shower Transfers: Independent                             Cognition  Overall Cognitive Status: WFL                                         Plan   Plan  Times per week: 5-7  Times per day: Twice a day  Plan weeks: [place] Tues to home / [facility] services  Specific instructions for Next Treatment: kitchen, standing tasks  Current Treatment Recommendations: Neuromuscular Re-education, Functional Mobility Training, Equipment Evaluation, Education, & procurement, Patient/Caregiver Education & Training, Self-Care / ADL, Safety Education & Training, Strengthening, Endurance Training, Home Management Training               Goals  Short term goals  Time Frame for Short term goals: 1 week:  Short term goal 1: Supervision for grooming tasks  Goal Met  Short term goal 2: SBA for bathing   Goal Met  Short term goal 3: SBA for dressing   Goal Met  Short term goal 4: Modified independent bed mobility   Goal Met  Short term goal 5: SBA for transfers   Goal Met  Short term goal 6: Patient to be able to perform R hand strength/coordination activities with Minimal verbal cues   Goal

## 2019-09-09 NOTE — PROGRESS NOTES
Precautions: Small bites/sips;Eat/Feed slowly; Remain upright for 30-45 minutes after meals;Upright as possible for all oral intake     Social/Functional History  Lives With: Spouse  Type of Home: (condo)  Home Layout: One level, Laundry in basement(bedroom in basement)  Home Access: Stairs to enter without rails  Entrance Stairs - Number of Steps: 1+1  Bathroom Shower/Tub: Walk-in shower  Bathroom Toilet: Standard  Bathroom Equipment: Built-in shower seat, Grab bars in shower, 3-in-1 commode(sink near commode)  Home Equipment: Cane, Rolling walker, Lift chair  ADL Assistance: Independent  Homemaking Assistance: Independent(shares with wife)  Homemaking Responsibilities: Yes  Meal Prep Responsibility: (share)  Laundry Responsibility: (share)  Cleaning Responsibility: (share)  Bill Paying/Finance Responsibility: No  Shopping Responsibility: No  Health Care Management: Primary  Ambulation Assistance: Independent  Transfer Assistance: Independent  Active : Yes  Mode of Transportation: [facility]  Occupation: Retired  Type of occupation:  for [address] [place] [address]: Likes [employer]  IADL Comments: mainly gets carryout for meals. Additional Comments: Denies h/o falls. Plays golf 2x a week    Subjective   General  Chart Reviewed: Yes  Additional Pertinent Hx: Per Dr. Stephanie Pearson, 8/30, \"73 y.o. male who presented to Hospital with 1 day history of right arm weakness and facial droop and dysarthria. MRI brain w/o contrast 8/29/19 revealed Acute left basal ganglia infarct w/ petechial hemorrhage. Patient approved by insurance for Rehab Unit admit today. Will admit later today to our Rehab Unit. \"     Response To Previous Treatment: Patient with no complaints from previous session. Family / Caregiver Present: No  Referring Practitioner: Dr. Stephanie Pearson  Subjective  Subjective: Pt pleasant and agreeable to PT treatment. Denies pain.           Orientation  Orientation  Overall Orientation Status: Within Functional Ambulation 150' with LRAD, SBA- met 9/3  Short term goal 4: Ascend/Descend 12 steps with SBA - MET 9/5  Short term goal 5: Ascend/Descend curb step with SBA - MET 9/5  Long term goals  Time Frame for Long term goals : In 7-10 days pt will perform  Long term goal 1: Bed mobility (I)  Long term goal 2: Transfers Mod I/(I)  Long term goal 3: Ambulation 150' with LRAD, Mod I/(I)  Long term goal 4: Ascend/Descend 12 steps Mod I/(I)  Long term goal 5: Ascend/Descend curb step with LRAD, Mod I/(I)  Patient Goals   Patient goals : To return home when able    PM session:   Pt pleasant and agreeable to PT treatment. Denies pain. Pt ambulated approx 280' indep without an AD with multiple turns. Continues with decreased R foot clearance, especially with fatigue. Pt provided with standing HEP and practiced 15 reps of each with BUE support on parallel bars: heel raises, alternating marches, alternating hamstring curls, alternating hip ABD, alternating hip ext, minisquats. Balance activities: forward stepping without an AD over 4 cones 4 trials leading with RLE and 6 trials leading with LLE with CGA and occasional Kavon for balance; pt did not knock over any cones; cues when leading with LLE not to drag RLE.  4 square stepping CW and CCW 5 reps each without an AD with CGA-Kavon for balance. Pt with decreased step length and some minor LOB when stepping backwards  Pt ambulated approx 150' on multiple uneven surfaces on therapy patio with no AD and supervision, including up/down ramp 2 trials. Pt ambulated approx 75' back to room at end of session without an AD indep. Mod I for transfers throughout the session. Pt tolerated tx session well.     Safety Device - Type of devices:  [x]  All fall risk precautions in place [] Bed alarm in place  [x] Call light within reach [x] Chair alarm in place [] Positioning belt [x] Gait belt [] Patient at risk for falls [] Left in bed [x] Left in chair [] Telesitter in use [] Sitter present [] Nurse notified []  None  Electronically signed by Sim Webster, PT [id number] on 9/9/2019 at 2:27 PM    Plan    Plan  Times per week: 5-6x  Times per day: Twice a day  Specific instructions for Next Treatment: Progress ambulation with LRAD; improve balance and RLE strength  Current Treatment Recommendations: Strengthening, Balance Training, Functional Mobility Training, Transfer Training, Gait Training, Stair training, Neuromuscular Re-education, Cognitive/Perceptual Training, Endurance Training, Home Exercise Program, Safety Education & Training, Patient/Caregiver Education & Training, Equipment Evaluation, Education, & procurement  Plan Comment: Anticipate discharge to home with wife and home PT on Tuesday, 9/10. Safety Devices  Type of devices:  All fall risk precautions in place, Gait belt  Restraints  Initially in place: No     Therapy Time   Individual Concurrent Group Co-treatment   Time In 0815         Time Out 0900         Minutes 45         Timed Code Treatment Minutes: 45 Minutes         Electronically signed by Sim Webster, PT [id number] on 9/9/2019 at 8:55 AM     Second Session Therapy Time     Individual Co-treatment   Time In 7892     Time Out 1430     Minutes 39

## 2019-09-10 ENCOUNTER — TELEPHONE (OUTPATIENT)
Dept: CARDIOLOGY CLINIC | Age: 74
End: 2019-09-10

## 2019-09-10 VITALS
RESPIRATION RATE: 16 BRPM | HEART RATE: 59 BPM | OXYGEN SATURATION: 98 % | DIASTOLIC BLOOD PRESSURE: 68 MMHG | SYSTOLIC BLOOD PRESSURE: 123 MMHG | WEIGHT: 186.51 LBS | TEMPERATURE: 98.1 F | HEIGHT: 70 IN | BODY MASS INDEX: 26.7 KG/M2

## 2019-09-10 PROCEDURE — 6370000000 HC RX 637 (ALT 250 FOR IP): Performed by: PHYSICAL MEDICINE & REHABILITATION

## 2019-09-10 PROCEDURE — 6360000002 HC RX W HCPCS: Performed by: PHYSICAL MEDICINE & REHABILITATION

## 2019-09-10 RX ORDER — LISINOPRIL 5 MG/1
5 TABLET ORAL DAILY
Qty: 30 TABLET | Refills: 3 | Status: SHIPPED | OUTPATIENT
Start: 2019-09-10 | End: 2019-10-10

## 2019-09-10 RX ORDER — ROSUVASTATIN CALCIUM 40 MG/1
40 TABLET, COATED ORAL NIGHTLY
Qty: 30 TABLET | Refills: 3 | Status: SHIPPED | OUTPATIENT
Start: 2019-09-10 | End: 2019-10-10

## 2019-09-10 RX ADMIN — LISINOPRIL 5 MG: 5 TABLET ORAL at 08:09

## 2019-09-10 RX ADMIN — ENOXAPARIN SODIUM 40 MG: 40 INJECTION SUBCUTANEOUS at 08:09

## 2019-09-10 RX ADMIN — ASPIRIN 81 MG: 81 TABLET ORAL at 08:09

## 2019-09-10 ASSESSMENT — PAIN SCALES - GENERAL: PAINLEVEL_OUTOF10: 0

## 2019-09-10 NOTE — DISCHARGE SUMMARY
[facility]  Dr. Nuno Goldman Discharge Summary     Patient Identification:  Daphney Muhammad  : 1945  Admit date: 2019  Discharge date: 09/10/19   Attending provider: Doreen Mckinney MD        Primary care provider: Bhavani Hilliard     Discharge Diagnoses:   Patient Active Problem List   Diagnosis    Ischemic stroke [facility])    Left sided cerebral hemisphere cerebrovascular accident (CVA) [facility])         Discharge Functional Status:    Physical therapy:  Bed Mobility: Scooting: Independent  Transfers: Sit to Stand: Modified independent  Stand to sit: Modified independent  Bed to Chair: Modified independent(ambulating without an AD)  Comment: Pt assisted to the bathroom at end of session. Pants up/down with supervision. Pt performed theodore-care hygiene after bowel movement with supervision. Hand-hygiene with supervision. Pt set up with lunch meal tray at end of session. , Ambulation 1  Surface: level tile  Device: No Device  Assistance: Independent  Quality of Gait: intermittent decreased R foot clearance, especially with fatigue, but no LOB; occasional mild path deviation but no LOB, appropriate barbara  Distance: 26' with multiple turns; short distances in therapy gym; 205'x2 to car transfer and back  Comments: appears to have some R neglect but did not bump into any objects, Stairs  # Steps : 12  Stairs Height: 6\"  Rails: Right ascending  Curbs: 6\"(3 trials)  Device: No Device  Assistance: Independent, Modified independent (mod I on steps, indep curb step)  Comment: up/down 12 steps with R rails and SBA with reciprocal pattern steadily.   steady on curb step without an AD  Mobility: Bed, Chair, Wheel Chair: 5 - Requires setup/supervision/cues  Walk: 7- Complete [place]  Walks at least 150 feet Independently with no assistive device  Distance Walked: 280'  Stairs: 6 - Modified [place] Safely goes up and down at least one flight of stairs but requries a side support,

## 2019-09-10 NOTE — PROGRESS NOTES
Patient is a 68 y/o male admitted to rehab with acute basal ganglia infarct w/ petechial hemorrhage. A/A/O x 4. Transfers with walker x 1. On general diet, tolerating well. Medications taken whole in water. On lovenox for DVT prophylaxis. Skin L Chest - loop recorder site, dressing changed 8/31. On room air. Has been continent/incontinent of bowel and bladder. LBM 9/9/19. Chair/bed alarms in use and call light in reach. Will monitor for safety.

## 2019-09-10 NOTE — CARE COORDINATION
Critical access hospital    DC order noted, all docs needed have been faxed to [facility] for home care services.       Patient to be seen by 9/12        Rosie Dunn [address] [place] [address] Drive  Work mobile: [phone]  [facility] office: [phone]

## 2019-10-08 ENCOUNTER — NURSE ONLY (OUTPATIENT)
Dept: CARDIOLOGY CLINIC | Age: 74
End: 2019-10-08
Payer: MEDICARE

## 2019-10-08 DIAGNOSIS — I63.9 ISCHEMIC STROKE (HCC): ICD-10-CM

## 2019-10-08 DIAGNOSIS — Z45.09 ENCOUNTER FOR ELECTRONIC ANALYSIS OF REVEAL EVENT RECORDER: ICD-10-CM

## 2019-10-08 PROCEDURE — 93298 REM INTERROG DEV EVAL SCRMS: CPT | Performed by: INTERNAL MEDICINE

## 2019-10-08 PROCEDURE — 93299 PR REM INTERROG ICPMS/SCRMS <30 D TECH REVIEW: CPT | Performed by: INTERNAL MEDICINE

## 2019-11-12 ENCOUNTER — TELEPHONE (OUTPATIENT)
Dept: CARDIOLOGY CLINIC | Age: 74
End: 2019-11-12